# Patient Record
Sex: FEMALE | Race: WHITE
[De-identification: names, ages, dates, MRNs, and addresses within clinical notes are randomized per-mention and may not be internally consistent; named-entity substitution may affect disease eponyms.]

---

## 2018-03-23 ENCOUNTER — HOSPITAL ENCOUNTER (EMERGENCY)
Dept: HOSPITAL 75 - ER | Age: 83
Discharge: HOME | End: 2018-03-23
Payer: MEDICARE

## 2018-03-23 VITALS — HEIGHT: 63 IN | BODY MASS INDEX: 31.89 KG/M2 | WEIGHT: 180 LBS

## 2018-03-23 VITALS — DIASTOLIC BLOOD PRESSURE: 63 MMHG | SYSTOLIC BLOOD PRESSURE: 144 MMHG

## 2018-03-23 DIAGNOSIS — Z04.3: Primary | ICD-10-CM

## 2018-03-23 DIAGNOSIS — R40.2142: ICD-10-CM
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
## 2018-03-23 DIAGNOSIS — R40.2362: ICD-10-CM

## 2018-03-23 DIAGNOSIS — R40.2252: ICD-10-CM

## 2018-03-23 PROCEDURE — 72125 CT NECK SPINE W/O DYE: CPT

## 2018-03-23 PROCEDURE — 70450 CT HEAD/BRAIN W/O DYE: CPT

## 2018-03-23 NOTE — ED FALL/INJURY
General


Chief Complaint:  Trauma-Non Activation


Stated Complaint:  FALL


Nursing Triage Note:  


pt brought in by ems from [facility]. per nursing home staff, pt 


rolled out of bed and was found on floor. per pt, she had gotten herself 


dressed, fell on floor, and picked herself back up off the floor. pt tello any 


pain or hitting her head.


Source:  patient, EMS


Exam Limitations:  clinical condition (baseline dementia)





History of Present Illness


Date Seen by Provider:  Mar 23, 2018


Time Seen by Provider:  01:30


Initial Comments


Patient presents to ER by EMS from [facility] where staff says that 

she rolled out of bed she says she got herself back up because of back to bed 

and EMS says she was in bed and they got there. Patient denies having any pain 

anywhere. She was walking around the room without difficulty. She has no 

changes in vision, numbness, headache, nausea, shortness of breath, dysuria.





Allergies and Home Medications


Allergies


Coded Allergies:  


     No Known Drug Allergies (Unverified , 3/23/18)





Patient Home Medication List


Home Medication List Reviewed:  Yes





Constitutional:  No chills, No diaphoresis


Eyes:  Denies Blindness, Denies Blurred Vision, Denies Drainage


Ears, Nose, Mouth, Throat:  denies ear pain, denies nose pain


Respiratory:  No cough, No short of breath


Cardiovascular:  No chest pain, No edema


Gastrointestinal:  No abdominal pain, No diarrhea, No nausea


Genitourinary:  No discharge, No dysuria





Past Medical-Social-Family Hx


Patient Social History


Alcohol Use:  Denies Use


Recreational Drug Use:  No


Smoking Status:  Never a Smoker


Recent Foreign Travel:  No


Contact w/Someone Who Travel:  No


Recent Infectious Disease Expo:  No





Physical Exam


Vital Signs





Vital Signs - First Documented








 3/23/18





 00:42


 


Pulse 85


 


Resp 20


 


B/P (MAP) 144/63 (90)


 


Pulse Ox 95


 


O2 Delivery Room Air





Capillary Refill : Less Than 3 Seconds


General Appearance:  WD/WN, no apparent distress


HEENT:  PERRL/EOMI, pharynx normal, other (negative for hemotympanum or Puentes 

sign. No raccoon eyes)


Neck:  non-tender, full range of motion, supple, normal inspection


Cardiovascular:  normal peripheral pulses, regular rate, rhythm, no edema


Respiratory:  chest non-tender, lungs clear, normal breath sounds, no 

respiratory distress, no accessory muscle use


Peripheral Pulses:  2+ Dorsalis Pedis (R), 2+ Left Dors-Pedis (L), 2+ Radial 

Pulses (R), 2+ Radial Pulses (L)


Gastrointestinal:  normal bowel sounds, non tender, soft


Extremities:  normal range of motion, non-tender, normal capillary refill


Neurologic/Psychiatric:  CNs II-XII nml as tested, no motor/sensory deficits, 

alert, normal mood/affect, other (oriented to person and place)


Skin:  normal color, warm/dry





Dawson Coma Score


Best Eye Response:  (4) Open Spontaneously


Best Verbal Response:  (5) Oriented


Best Motor Response:  (6) Obeys Commands


Dawson Total:  15





Progress/Results/Core Measures


Results/Orders


My Orders





Orders - GREG SCHNEIDER


Ct Head/Cervical Spine Wo (3/23/18 03:23)





Vital Signs/I&O





Vital Sign - Last 12Hours








 3/23/18





 00:42


 


Pulse 85


 


Resp 20


 


B/P (MAP) 144/63 (90)


 


Pulse Ox 95


 


O2 Delivery Room Air














Blood Pressure Mean:  90








Progress Note :  


   Time:  03:27


Progress Note


The patient is not on aspirin or antiplatelet or blood thinners. She has been 

walking around the room at baseline. According to EMS her primary care provider 

was just trying to get her to go to [place] to get a CT scan of the head but 

they do not have CT capability.





Diagnostic Imaging





   Diagonstic Imaging:  CT


   Plain Films/CT/US/NM/MRI:  c-spine, head


Comments


No CT evidence for acute intracranial injury. No definite evidence for cervical 

spine fracture. Evaluation somewhat limited secondary to motion artifact. There 

is straightening of the normal cervical lordosis which may be related to muscle 

spasm. Degenerative changes of the cervical spine.


   Reviewed:  Reviewed by Me





Departure


Impression


Impression:  


 Primary Impression:  


 Fall


 Qualified Codes:  W19.XXXA - Unspecified fall, initial encounter


Disposition:  01 HOME, SELF-CARE


Condition:  Stable





Departure-Patient Inst.


Decision time for Depature:  04:33


Referrals:  


KWABENA ROWAN MD (PCP/Family)


Primary Care Physician


Patient Instructions:  Preventing Falls in the Older Adult





Copy


Copies To 1:   KWABENA ROWAN MD, TITUS J Mar 23, 2018 03:29

## 2018-03-23 NOTE — XMS REPORT
MU2 Ambulatory Summary

 Created on: 2017



Nay Goddard

External Reference #: [id number]

: 1929

Sex: Female



Demographics







 Address  [address] [place]  [address]

 

 Home Phone  [phone]

 

 Preferred Language  English

 

 Marital Status  Never 

 

 Scientologist Affiliation  Unknown

 

 Race  White

 

 Ethnic Group  Not  or 





Author







 Author  Afua Mcqueen

 

 [facility]

 

 Address  [address]

[place], [place]  [id number]



 

 Phone  [phone]







Care Team Providers







 Care Team Member Name  Role  Phone

 

 Afua Mcqueen  PCP  [phone]

 

 Afua Mcqueen  PreferredProvider  [id number]







Allergies and Adverse Reactions







 Name  Reaction  Notes

 

 No known allergies      







Plan of Treatment

Not available.



Medications







 Active 

 

 Name  Start Date  Estimated Completion Date  SIG  Comments

 

 carvedilol 25 mg oral tablet        take 1 tablet (25 mg) by oral route 2 
times per day with food   

 

 gemfibrozil 600 mg oral tablet        take 1 tablet (600 mg) by oral route 2 
times per day 30 minutes before morning and evening meal   

 

 levothyroxine 100 mcg oral tablet        take 1 tablet (100 mcg) by oral route 
once daily   

 

 trazodone 50 mg oral tablet        take 1 tablet (50 mg) by oral route once 
daily at bedtime   

 

 Vitamin D3 1,000 unit oral capsule        take 1 capsule by oral route daily   

 

 tramadol 50 mg oral tablet        take 1 tablet (50 mg) by oral route every 6 
hours as needed   

 

 fish oil with omega 3 600 mg / 300mg oral        take one capsule daily   

 

 loratadine 10 mg oral tablet        take 1 tablet (10 mg) by oral route once 
daily   

 

 cyanocobalamin (vitamin B-12) 1,000 mcg/mL injection solution  2017  3/21/
2018  inject 1 milliliter (1,000 mcg) by intramuscular route once a month for 
300 days   









  

 

 Name  Start Date  Expiration Date  SIG  Comments

 

 Symbicort 160-4.5 mcg/actuation inhalation HFA aerosol inhaler  2017  1 puff am and pm  and then rinse throat   

 

 Zithromax Z-Donnie 250 mg oral tablet  8/3/2017  2017  take 2 tablets (500 mg
) by oral route once daily for 1 day then 1 tablet (250 mg) by oral route once 
daily for 4 days   









 Discontinued 

 

 Name  Start Date  Discontinued Date  SIG  Comments

 

 tramadol 50 mg oral tablet     2017      







Problem List

Not available.



Vital Signs







 Date  Time  BP-Sys(mm[Hg]  BP-Dodie(mm[Hg])  HR(bpm)  RR(rpm)  Temp  WT  HT  HC  
BMI  BSA  BMI Percentile  O2 Sat(%)

 

 2017  3:13:00 PM  142 mmHg  90 mmHg  98 bpm  18 rpm  98.6 F  155 lbs  62 
in     28.35 kg/m2  1.75 m2     94 %

 

 2017  10:01:00 AM  112 mmHg  68 mmHg  54 bpm  18 rpm  97 F  154 lbs  62 
in     28.1667 kg/m  1.7481 m     93 %







Social History







 Name  Description  Comments

 

 retired      

 

 Tobacco  Former smoker  quit 25 years ago

 

 Alcohol  Never   

 

 Caffeine  Current every day   

 

 Exercises regularly     walking, gardening, cleaning house

 

       







History of Procedures







 Date Ordered  Description  Order Status

 

 2017 12:00 AM  URINALYSIS AUTO W/O SCOPE  Reviewed

 

 2017 12:00 AM  ASSAY OF CREATINE  Reviewed

 

 2017 12:00 AM  COMPLETE CBC AUTOMATED  Reviewed

 

 2017 12:00 AM  GLYCOSYLATED HEMOGLOBIN TEST  Reviewed

 

 2017 12:00 AM  COMPREHEN METABOLIC PANEL  Reviewed

 

 2017 12:00 AM  LIPID PANEL  Reviewed

 

 2017 12:00 AM  ASSAY OF MAGNESIUM  Reviewed

 

 2017 12:00 AM  ASSAY OF BLOOD/URIC ACID  Reviewed

 

 2017 12:00 AM  ASSAY TRIIODOTHYRONINE (T3)  Reviewed

 

 2017 12:00 AM  ASSAY THYROID STIM HORMONE  Reviewed

 

 2017 12:00 AM  ASSAY OF FREE THYROXINE  Reviewed

 

 2017 12:00 AM  VITAMIN D 25 HYDROXY  Reviewed

 

 2017 12:00 AM  VITAMIN B-12  Reviewed

 

 2017 12:00 AM  ASSAY OF FOLIC ACID SERUM  Reviewed

 

 2017 12:00 AM  ASSAY OF PARATHORMONE  Reviewed

 

 2017 12:00 AM  ROUTINE VENIPUNCTURE  Reviewed

 

 2017 12:00 AM  THER/PROPH/DIAG INJ SC/IM  Reviewed

 

 2017 12:00 AM  Depo-Medrol 40mg Injection  Reviewed

 

 8/3/2017 12:00 AM  THER/PROPH/DIAG INJ SC/IM  Reviewed

 

 8/3/2017 12:00 AM  Decadron 4mg Injection  Reviewed

 

 8/3/2017 12:00 AM  Depo-Medrol 40mg Injection  Reviewed

 

 2017 12:00 AM  THER/PROPH/DIAG INJ SC/IM  Reviewed

 

 2017 12:00 AM  B12 1000mcg Injection  Reviewed







Results Summary







 Date and Description  Results

 

 2017 9:00 AM  FREE T4 0.71 TSH 4.630 uIU/mLT3 TOTAL 51.0 ng/dLVITAMIN B12 
606.0 pg/mLFOLATE 6.30 ng/mLPROTEIN UR <7 mg/dLCREAT UR RAND 18.90 mg/dLVITAMIN 
D 55.20 ng/mLHGB A1C 5.20 %Est Avg Glucose 102.5 mg/dLGLUCOSE 104.0 mg/dLSODIUM 
139.0 mmol/LPOTASSIUM 5.30 mmol/LCHLORIDE 103.0 mmol/LCO2 30.0 mmol/LBUN 23.0 mg
/dLCREATININE 1.60 mg/dLSGOT/AST 16.0 IU/LSGPT/ALT 7.0 IU/LALK PHOS 65.0 IU/
LTOTAL PROTEIN 6.60 g/dLALBUMIN 4.0 g/dLTOTAL BILI 0.30 mg/dLCALCIUM 9.0 mg/
dLAGE 87 GFR NonAA 30 GFR AA 36 eGFR 30 eGFR AA* 36 MAGNESIUM 2.0 mg/
dLTRIGLYCERIDES 145.0 mg/dLCHOLESTEROL 226.0 mg/dLHDL 27.0 mg/dLTOT CHOL/HDL 
8.4 LDL (CALC) 170.0 mg/dLURIC ACID 6.2 mg/dLWBC 4.9 RBC 3.42 HGB 10.40 g/dLHCT 
33.30 %MCV 97.0 fLMCH 30.40 pgMCHC 31.20 g/dLRDW SD 50 RDW CV 14.0 %MPV 11.50 
fLPLT 152 NRBC# 0.00 NRBC% 0.0 %NEUT 64.70 %%LYMP 19.90 %%MONO 7.30 %%EOS 6.70 %
%BASO 0.80 %#NEUT 3.18 #LYMP 0.98 #MONO 0.36 #EOS 0.33 #BASO 0.04 SEGS 66 BANDS 
1 LYMPHS 20 MONOS 8 EOS 5.0 %PTH, Intact 38 







History Of Immunizations

Not available.



History of Past Illness







 Name  Date of Onset  Comments

 

 Arthritis      

 

 Hyperlipemia      

 

 Kidney disease      

 

 Hypothyroidism      

 

 Seasonal allergic rhinitis due to pollen  May 25 2017 10:12AM   

 

 Hyperlipidemia, Mixed  May 25 2017 10:12AM   

 

 Chronic kidney disease, Stage III (moderate)  May 25 2017 10:12AM   

 

 Acquired hypothyroidism  May 25 2017 10:12AM   

 

 Pernicious anemia  May 25 2017 10:12AM   

 

 Vitamin D deficiency  May 25 2017 10:12AM   

 

 COPD (chronic obstructive pulmonary disease) with chronic bronchitis  May 25 
2017 10:12AM   

 

 Insomnia  May 25 2017 10:12AM   

 

 Arthritis  May 25 2017 10:12AM   

 

 Hyperparathyroidism due to renal insufficiency  May 25 2017 10:12AM   

 

 Hyperglycemia  May 25 2017 10:12AM   

 

 Benign essential HTN  May 25 2017 10:12AM   

 

 Acute kidney failure, unspecified  2017  9:36AM   

 

 Chronic kidney disease, stage 3 (moderate)  2017  9:36AM   

 

 Essential hypertension  2017  9:36AM   

 

 Blood glucose elevated  2017  9:36AM   

 

 Acquired hyperlipoproteinemia  2017  9:36AM   

 

 Acquired hypothyroidism  2017  9:36AM   

 

 Low vitamin D level  2017  9:36AM   

 

 [place] anemia  2017  9:36AM   

 

 Hyperparathyroidism, secondary renal  2017  9:36AM   

 

 Other vitamin B12 deficiency anemia  2017  3:16PM   

 

 DDD (degenerative disc disease), lumbar  2017  3:16PM   

 

 Sciatica of left side  2017  3:16PM   

 

 CKD (chronic kidney disease) stage 3, GFR 30-59 ml/min  2017  3:16PM   

 

 Acute seasonal allergic rhinitis due to pollen  Aug  3 2017 11:57AM   

 

 Acute bronchitis, unspecified organism  Aug  3 2017 11:57AM   

 

 B12 deficiency  2017 10:25AM   







Payers







 Insurance Name  Company Name  Plan Name  Plan Number  Policy Number  Policy 
Group Number  Start Date

 

    Humana  Humana     [id number]     N/A

 

    Medicare Part B  [employer] [place]     [id number]     N/A







History of Encounters







 Visit Date  Visit Type  Provider

 

 2017  Nurse visit  Afua Mcqueen APRN

 

 8/3/2017  Office visit  Afua Mcqueen APRN

 

 2017  Office visit  Afua Mcqueen APRN

 

 2017  Laboratory  Afua Mcqueen APRN

 

 2017  Office visit  Afua Mcqueen APRN

 

 2011  Alvaro Zavala MD

## 2018-03-23 NOTE — XMS REPORT
MU2 Ambulatory Summary

 Created on: 2017



Nay Goddard

External Reference #: [id number]

: 1929

Sex: Female



Demographics







 Address  [address] [place]  [address]

 

 Home Phone  [phone]

 

 Preferred Language  English

 

 Marital Status  Never 

 

 Amish Affiliation  Unknown

 

 Race  White

 

 Ethnic Group  Not  or 





Author







 Author  Afua Mcqueen

 

 [facility]

 

 Address  [address]

[place], [place]  [address]



 

 Phone  [phone]







Care Team Providers







 Care Team Member Name  Role  Phone

 

 Afua Mcqueen  PCP  [phone]

 

 Afua Mcqueen  PreferredProvider  [id number]







Allergies and Adverse Reactions







 Name  Reaction  Notes

 

 No known allergies      







Plan of Treatment

Not available.



Medications







 Active 

 

 Name  Start Date  Estimated Completion Date  SIG  Comments

 

 carvedilol 25 mg oral tablet        take 1 tablet (25 mg) by oral route 2 
times per day with food   

 

 gemfibrozil 600 mg oral tablet        take 1 tablet (600 mg) by oral route 2 
times per day 30 minutes before morning and evening meal   

 

 levothyroxine 100 mcg oral tablet        take 1 tablet (100 mcg) by oral route 
once daily   

 

 trazodone 50 mg oral tablet        take 1 tablet (50 mg) by oral route once 
daily at bedtime   

 

 Vitamin D3 1,000 unit oral capsule        take 1 capsule by oral route daily   

 

 tramadol 50 mg oral tablet        take 1 tablet (50 mg) by oral route every 6 
hours as needed   

 

 fish oil with omega 3 600 mg / 300mg oral        take one capsule daily   

 

 loratadine 10 mg oral tablet        take 1 tablet (10 mg) by oral route once 
daily   

 

 cyanocobalamin (vitamin B-12) 1,000 mcg/mL injection solution  2017  3/21/
2018  inject 1 milliliter (1,000 mcg) by intramuscular route once a month for 
300 days   









  

 

 Name  Start Date  Expiration Date  SIG  Comments

 

 Symbicort 160-4.5 mcg/actuation inhalation HFA aerosol inhaler  2017  1 puff am and pm  and then rinse throat   









 Discontinued 

 

 Name  Start Date  Discontinued Date  SIG  Comments

 

 tramadol 50 mg oral tablet     2017      







Problem List

Not available.



Vital Signs







 Date  Time  BP-Sys(mm[Hg]  BP-Dodie(mm[Hg])  HR(bpm)  RR(rpm)  Temp  WT  HT  HC  
BMI  BSA  BMI Percentile  O2 Sat(%)

 

 2017  3:13:00 PM  142 mmHg  90 mmHg  98 bpm  18 rpm  98.6 F  155 lbs  62 
in     28.35 kg/m2  1.75 m2     94 %

 

 2017  10:01:00 AM  112 mmHg  68 mmHg  54 bpm  18 rpm  97 F  154 lbs  62 
in     28.1667 kg/m  1.7481 m     93 %







Social History







 Name  Description  Comments

 

 retired      

 

 Tobacco  Former smoker  quit 25 years ago

 

 Alcohol  Never   

 

 Caffeine  Current every day   

 

 Exercises regularly     walking, gardening, cleaning house

 

       







History of Procedures







 Date Ordered  Description  Order Status

 

 2017 12:00 AM  URINALYSIS AUTO W/O SCOPE  Reviewed

 

 2017 12:00 AM  ASSAY OF CREATINE  Returned

 

 2017 12:00 AM  COMPLETE CBC AUTOMATED  Returned

 

 2017 12:00 AM  GLYCOSYLATED HEMOGLOBIN TEST  Returned

 

 2017 12:00 AM  COMPREHEN METABOLIC PANEL  Returned

 

 2017 12:00 AM  LIPID PANEL  Returned

 

 2017 12:00 AM  ASSAY OF MAGNESIUM  Returned

 

 2017 12:00 AM  ASSAY OF BLOOD/URIC ACID  Returned

 

 2017 12:00 AM  ASSAY TRIIODOTHYRONINE (T3)  Returned

 

 2017 12:00 AM  ASSAY THYROID STIM HORMONE  Returned

 

 2017 12:00 AM  ASSAY OF FREE THYROXINE  Returned

 

 2017 12:00 AM  VITAMIN D 25 HYDROXY  Returned

 

 2017 12:00 AM  VITAMIN B-12  Returned

 

 2017 12:00 AM  ASSAY OF FOLIC ACID SERUM  Returned

 

 2017 12:00 AM  ASSAY OF PARATHORMONE  Returned

 

 2017 12:00 AM  ROUTINE VENIPUNCTURE  Reviewed

 

 2017 12:00 AM  THER/PROPH/DIAG INJ SC/IM  Reviewed

 

 2017 12:00 AM  Depo-Medrol 40mg Injection  Reviewed







Results Summary







 Date and Description  Results

 

 2017 9:00 AM  FREE T4 0.71 TSH 4.630 uIU/mLT3 TOTAL 51.0 ng/dLVITAMIN B12 
606.0 pg/mLFOLATE 6.30 ng/mLPROTEIN UR <7 mg/dLCREAT UR RAND 18.90 mg/dLVITAMIN 
D 55.20 ng/mLHGB A1C 5.20 %Est Avg Glucose 102.5 mg/dLGLUCOSE 104.0 mg/dLSODIUM 
139.0 mmol/LPOTASSIUM 5.30 mmol/LCHLORIDE 103.0 mmol/LCO2 30.0 mmol/LBUN 23.0 mg
/dLCREATININE 1.60 mg/dLSGOT/AST 16.0 IU/LSGPT/ALT 7.0 IU/LALK PHOS 65.0 IU/
LTOTAL PROTEIN 6.60 g/dLALBUMIN 4.0 g/dLTOTAL BILI 0.30 mg/dLCALCIUM 9.0 mg/
dLAGE 87 GFR NonAA 30 GFR AA 36 eGFR 30 eGFR AA* 36 MAGNESIUM 2.0 mg/
dLTRIGLYCERIDES 145.0 mg/dLCHOLESTEROL 226.0 mg/dLHDL 27.0 mg/dLTOT CHOL/HDL 
8.4 LDL (CALC) 170.0 mg/dLURIC ACID 6.2 mg/dLWBC 4.9 RBC 3.42 HGB 10.40 g/dLHCT 
33.30 %MCV 97.0 fLMCH 30.40 pgMCHC 31.20 g/dLRDW SD 50 RDW CV 14.0 %MPV 11.50 
fLPLT 152 NRBC# 0.00 NRBC% 0.0 %NEUT 64.70 %%LYMP 19.90 %%MONO 7.30 %%EOS 6.70 %
%BASO 0.80 %#NEUT 3.18 #LYMP 0.98 #MONO 0.36 #EOS 0.33 #BASO 0.04 SEGS 66 BANDS 
1 LYMPHS 20 MONOS 8 EOS 5.0 %PTH, Intact 38 







History Of Immunizations

Not available.



History of Past Illness







 Name  Date of Onset  Comments

 

 Arthritis      

 

 Hyperlipemia      

 

 Kidney disease      

 

 Hypothyroidism      

 

 Seasonal allergic rhinitis due to pollen  May 25 2017 10:12AM   

 

 Hyperlipidemia, Mixed  May 25 2017 10:12AM   

 

 Chronic kidney disease, Stage III (moderate)  May 25 2017 10:12AM   

 

 Acquired hypothyroidism  May 25 2017 10:12AM   

 

 Pernicious anemia  May 25 2017 10:12AM   

 

 Vitamin D deficiency  May 25 2017 10:12AM   

 

 COPD (chronic obstructive pulmonary disease) with chronic bronchitis  May 25 
2017 10:12AM   

 

 Insomnia  May 25 2017 10:12AM   

 

 Arthritis  May 25 2017 10:12AM   

 

 Hyperparathyroidism due to renal insufficiency  May 25 2017 10:12AM   

 

 Hyperglycemia  May 25 2017 10:12AM   

 

 Benign essential HTN  May 25 2017 10:12AM   

 

 Acute kidney failure, unspecified  2017  9:36AM   

 

 Chronic kidney disease, stage 3 (moderate)  2017  9:36AM   

 

 Essential hypertension  2017  9:36AM   

 

 Blood glucose elevated  2017  9:36AM   

 

 Acquired hyperlipoproteinemia  2017  9:36AM   

 

 Acquired hypothyroidism  2017  9:36AM   

 

 Low vitamin D level  2017  9:36AM   

 

 [place] anemia  2017  9:36AM   

 

 Hyperparathyroidism, secondary renal  2017  9:36AM   

 

 Other vitamin B12 deficiency anemia  2017  3:16PM   

 

 DDD (degenerative disc disease), lumbar  2017  3:16PM   

 

 Sciatica of left side  2017  3:16PM   

 

 CKD (chronic kidney disease) stage 3, GFR 30-59 ml/min  2017  3:16PM   







Payers







 Insurance Name  Company Name  Plan Name  Plan Number  Policy Number  Policy 
Group Number  Start Date

 

    Humana  Hum[employer]     [id number]     N/A

 

    Medicare Part B  [place] [place]     [id number]     N/A







History of Encounters







 Visit Date  Visit Type  Provider

 

 2017  Office visit  Afua Mcqueen APRN

 

 2017  Laboratory  Afua Mcqueen APRN

 

 2017  Office visit  Afua Mcqueen APRN

 

 2011  Hospital  JESSICA Zavala MD

## 2018-03-23 NOTE — XMS REPORT
MU2 Ambulatory Summary

 Created on: 2017



Nay Goddard

External Reference #: [id number]

: 1929

Sex: Female



Demographics







 Address  [address] [place]  [address]

 

 Home Phone  [phone]

 

 Preferred Language  English

 

 Marital Status  Never 

 

 Congregational Affiliation  Unknown

 

 Race  White

 

 Ethnic Group  Not  or 





Author







 Author  Afua Mcqueen

 

 [facility]

 

 Address  [address]

[place], [place]  [address]



 

 Phone  [phone]







Care Team Providers







 Care Team Member Name  Role  Phone

 

 Afua Mcqueen  PCP  [phone]

 

 Afua Mcqueen  PreferredProvider  [id number]







Allergies and Adverse Reactions







 Name  Reaction  Notes

 

 No known allergies      







Plan of Treatment

Not available.



Medications







 Active 

 

 Name  Start Date  Estimated Completion Date  SIG  Comments

 

 carvedilol 25 mg oral tablet        take 1 tablet (25 mg) by oral route 2 
times per day with food   

 

 gemfibrozil 600 mg oral tablet        take 1 tablet (600 mg) by oral route 2 
times per day 30 minutes before morning and evening meal   

 

 levothyroxine 100 mcg oral tablet        take 1 tablet (100 mcg) by oral route 
once daily   

 

 trazodone 50 mg oral tablet        take 1 tablet (50 mg) by oral route once 
daily at bedtime   

 

 Vitamin D3 1,000 unit oral capsule        take 1 capsule by oral route daily   

 

 tramadol 50 mg oral tablet        take 1 tablet (50 mg) by oral route every 6 
hours as needed   

 

 fish oil with omega 3 600 mg / 300mg oral        take one capsule daily   

 

 loratadine 10 mg oral tablet        take 1 tablet (10 mg) by oral route once 
daily   

 

 cyanocobalamin (vitamin B-12) 1,000 mcg/mL injection solution  2017  3/21/
2018  inject 1 milliliter (1,000 mcg) by intramuscular route once a month for 
300 days   









  

 

 Name  Start Date  Expiration Date  SIG  Comments

 

 Symbicort 160-4.5 mcg/actuation inhalation HFA aerosol inhaler  2017  1 puff am and pm  and then rinse throat   

 

 Zithromax Z-Donnie 250 mg oral tablet  8/3/2017  2017  take 2 tablets (500 mg
) by oral route once daily for 1 day then 1 tablet (250 mg) by oral route once 
daily for 4 days   









 Discontinued 

 

 Name  Start Date  Discontinued Date  SIG  Comments

 

 tramadol 50 mg oral tablet     2017      







Problem List

Not available.



Vital Signs







 Date  Time  BP-Sys(mm[Hg]  BP-Dodie(mm[Hg])  HR(bpm)  RR(rpm)  Temp  WT  HT  HC  
BMI  BSA  BMI Percentile  O2 Sat(%)

 

 2017  3:13:00 PM  142 mmHg  90 mmHg  98 bpm  18 rpm  98.6 F  155 lbs  62 
in     28.35 kg/m2  1.75 m2     94 %

 

 2017  10:01:00 AM  112 mmHg  68 mmHg  54 bpm  18 rpm  97 F  154 lbs  62 
in     28.1667 kg/m  1.7481 m     93 %







Social History







 Name  Description  Comments

 

 retired      

 

 Tobacco  Former smoker  quit 25 years ago

 

 Alcohol  Never   

 

 Caffeine  Current every day   

 

 Exercises regularly     walking, gardening, cleaning house

 

       







History of Procedures







 Date Ordered  Description  Order Status

 

 2017 12:00 AM  URINALYSIS AUTO W/O SCOPE  Reviewed

 

 2017 12:00 AM  ASSAY OF CREATINE  Reviewed

 

 2017 12:00 AM  COMPLETE CBC AUTOMATED  Reviewed

 

 2017 12:00 AM  GLYCOSYLATED HEMOGLOBIN TEST  Reviewed

 

 2017 12:00 AM  COMPREHEN METABOLIC PANEL  Reviewed

 

 2017 12:00 AM  LIPID PANEL  Reviewed

 

 2017 12:00 AM  ASSAY OF MAGNESIUM  Reviewed

 

 2017 12:00 AM  ASSAY OF BLOOD/URIC ACID  Reviewed

 

 2017 12:00 AM  ASSAY TRIIODOTHYRONINE (T3)  Reviewed

 

 2017 12:00 AM  ASSAY THYROID STIM HORMONE  Reviewed

 

 2017 12:00 AM  ASSAY OF FREE THYROXINE  Reviewed

 

 2017 12:00 AM  VITAMIN D 25 HYDROXY  Reviewed

 

 2017 12:00 AM  VITAMIN B-12  Reviewed

 

 2017 12:00 AM  ASSAY OF FOLIC ACID SERUM  Reviewed

 

 2017 12:00 AM  ASSAY OF PARATHORMONE  Reviewed

 

 2017 12:00 AM  ROUTINE VENIPUNCTURE  Reviewed

 

 2017 12:00 AM  THER/PROPH/DIAG INJ SC/IM  Reviewed

 

 2017 12:00 AM  Depo-Medrol 40mg Injection  Reviewed

 

 8/3/2017 12:00 AM  THER/PROPH/DIAG INJ SC/IM  Reviewed

 

 8/3/2017 12:00 AM  Decadron 4mg Injection  Reviewed

 

 8/3/2017 12:00 AM  Depo-Medrol 40mg Injection  Reviewed

 

 2017 12:00 AM  THER/PROPH/DIAG INJ SC/IM  Reviewed

 

 2017 12:00 AM  B12 1000mcg Injection  Reviewed







Results Summary







 Date and Description  Results

 

 2017 9:00 AM  FREE T4 0.71 TSH 4.630 uIU/mLT3 TOTAL 51.0 ng/dLVITAMIN B12 
606.0 pg/mLFOLATE 6.30 ng/mLPROTEIN UR <7 mg/dLCREAT UR RAND 18.90 mg/dLVITAMIN 
D 55.20 ng/mLHGB A1C 5.20 %Est Avg Glucose 102.5 mg/dLGLUCOSE 104.0 mg/dLSODIUM 
139.0 mmol/LPOTASSIUM 5.30 mmol/LCHLORIDE 103.0 mmol/LCO2 30.0 mmol/LBUN 23.0 mg
/dLCREATININE 1.60 mg/dLSGOT/AST 16.0 IU/LSGPT/ALT 7.0 IU/LALK PHOS 65.0 IU/
LTOTAL PROTEIN 6.60 g/dLALBUMIN 4.0 g/dLTOTAL BILI 0.30 mg/dLCALCIUM 9.0 mg/
dLAGE 87 GFR NonAA 30 GFR AA 36 eGFR 30 eGFR AA* 36 MAGNESIUM 2.0 mg/
dLTRIGLYCERIDES 145.0 mg/dLCHOLESTEROL 226.0 mg/dLHDL 27.0 mg/dLTOT CHOL/HDL 
8.4 LDL (CALC) 170.0 mg/dLURIC ACID 6.2 mg/dLWBC 4.9 RBC 3.42 HGB 10.40 g/dLHCT 
33.30 %MCV 97.0 fLMCH 30.40 pgMCHC 31.20 g/dLRDW SD 50 RDW CV 14.0 %MPV 11.50 
fLPLT 152 NRBC# 0.00 NRBC% 0.0 %NEUT 64.70 %%LYMP 19.90 %%MONO 7.30 %%EOS 6.70 %
%BASO 0.80 %#NEUT 3.18 #LYMP 0.98 #MONO 0.36 #EOS 0.33 #BASO 0.04 SEGS 66 BANDS 
1 LYMPHS 20 MONOS 8 EOS 5.0 %PTH, Intact 38 







History Of Immunizations

Not available.



History of Past Illness







 Name  Date of Onset  Comments

 

 Arthritis      

 

 Hyperlipemia      

 

 Kidney disease      

 

 Hypothyroidism      

 

 Seasonal allergic rhinitis due to pollen  May 25 2017 10:12AM   

 

 Hyperlipidemia, Mixed  May 25 2017 10:12AM   

 

 Chronic kidney disease, Stage III (moderate)  May 25 2017 10:12AM   

 

 Acquired hypothyroidism  May 25 2017 10:12AM   

 

 Pernicious anemia  May 25 2017 10:12AM   

 

 Vitamin D deficiency  May 25 2017 10:12AM   

 

 COPD (chronic obstructive pulmonary disease) with chronic bronchitis  May 25 
2017 10:12AM   

 

 Insomnia  May 25 2017 10:12AM   

 

 Arthritis  May 25 2017 10:12AM   

 

 Hyperparathyroidism due to renal insufficiency  May 25 2017 10:12AM   

 

 Hyperglycemia  May 25 2017 10:12AM   

 

 Benign essential HTN  May 25 2017 10:12AM   

 

 Acute kidney failure, unspecified  2017  9:36AM   

 

 Chronic kidney disease, stage 3 (moderate)  2017  9:36AM   

 

 Essential hypertension  2017  9:36AM   

 

 Blood glucose elevated  2017  9:36AM   

 

 Acquired hyperlipoproteinemia  2017  9:36AM   

 

 Acquired hypothyroidism  2017  9:36AM   

 

 Low vitamin D level  2017  9:36AM   

 

 [place] anemia  2017  9:36AM   

 

 Hyperparathyroidism, secondary renal  2017  9:36AM   

 

 Other vitamin B12 deficiency anemia  2017  3:16PM   

 

 DDD (degenerative disc disease), lumbar  2017  3:16PM   

 

 Sciatica of left side  2017  3:16PM   

 

 CKD (chronic kidney disease) stage 3, GFR 30-59 ml/min  2017  3:16PM   

 

 Acute seasonal allergic rhinitis due to pollen  Aug  3 2017 11:57AM   

 

 Acute bronchitis, unspecified organism  Aug  3 2017 11:57AM   

 

 B12 deficiency  2017 10:25AM   







Payers







 Insurance Name  Company Name  Plan Name  Plan Number  Policy Number  Policy 
Group Number  Start Date

 

    Humana  Humana     [id number]     N/A

 

    Medicare [place] B  [place]     [id number]     N/A







History of Encounters







 Visit Date  Visit Type  Provider

 

 2017  Nurse visit  Afua Mcqueen APRN

 

 8/3/2017  Office visit  Afua Mcqueen APRN

 

 2017  Office visit  Afua Mcqueen APRN

 

 2017  Laboratory  Afua Mcqueen APRN

 

 2017  Office visit  Afua Mcqueen APRN

 

 2011  Alvaro Zavala MD

## 2018-03-23 NOTE — XMS REPORT
MU2 Ambulatory Summary

 Created on: 2017



Nay Goddard

External Reference #: [id number]

: 1929

Sex: Female



Demographics







 Address  [address] [place]  [address]

 

 Home Phone  [phone]

 

 Preferred Language  English

 

 Marital Status  Never 

 

 Anglican Affiliation  Unknown

 

 Race  White

 

 Ethnic Group  Not  or 





Author







 Author  Afua Mcqueen

 

 [facility]

 

 Address  [address]

[place], [place]  [address]



 

 Phone  [phone]







Care Team Providers







 Care Team Member Name  Role  Phone

 

 Afua Mcqueen  PCP  [phone]

 

 Afua Mcqueen  PreferredProvider  [id number]







Allergies and Adverse Reactions







 Name  Reaction  Notes

 

 No known allergies      







Plan of Treatment







 Planned Activity  Comments  Planned Date  Planned Time  Plan/Goal

 

 PROTEIN URINE DIPSTICK     2017  12:00 AM   

 

 Creatine ur     2017  12:00 AM   

 

 CBC W/ MANUAL DIFF     2017  12:00 AM   

 

 HEMOGLOBIN A1C     2017  12:00 AM   

 

 CMP     2017  12:00 AM   

 

 LIPID PANEL     2017  12:00 AM   

 

 Magnesium level     2017  12:00 AM   

 

 URIC ACID.     2017  12:00 AM   

 

 Total triiodothyronine (T3) measurement     2017  12:00 AM   

 

 TSH     2017  12:00 AM   

 

 Free thyroxine (FT4) measurement     2017  12:00 AM   

 

 VITAMIN D (25 HYDROXY)     2017  12:00 AM   

 

 VITAMIN B12 & FOLATE     2017  12:00 AM   

 

 VITAMIN B12 & FOLATE     2017  12:00 AM   

 

 Parathyroid hormone (PTH) measurement     2017  12:00 AM   







Medications







 Active 

 

 Name  Start Date  Estimated Completion Date  SIG  Comments

 

 carvedilol 25 mg oral tablet        take 1 tablet (25 mg) by oral route 2 
times per day with food   

 

 gemfibrozil 600 mg oral tablet        take 1 tablet (600 mg) by oral route 2 
times per day 30 minutes before morning and evening meal   

 

 levothyroxine 100 mcg oral tablet        take 1 tablet (100 mcg) by oral route 
once daily   

 

 trazodone 50 mg oral tablet        take 1 tablet (50 mg) by oral route once 
daily at bedtime   

 

 Vitamin D3 1,000 unit oral capsule        take 1 capsule by oral route daily   

 

 tramadol 50 mg oral tablet        take 1 tablet (50 mg) by oral route every 6 
hours as needed   

 

 fish oil with omega 3 600 mg / 300mg oral        take one capsule daily   

 

 loratadine 10 mg oral tablet        take 1 tablet (10 mg) by oral route once 
daily   

 

 Symbicort 160-4.5 mcg/actuation inhalation HFA aerosol inhaler  2017  1 puff am and pm  and then rinse throat   

 

 cyanocobalamin (vitamin B-12) 1,000 mcg/mL injection solution  2017  3/21/
2018  inject 1 milliliter (1,000 mcg) by intramuscular route once a month for 
300 days   









 Discontinued 

 

 Name  Start Date  Discontinued Date  SIG  Comments

 

 tramadol 50 mg oral tablet     2017      







Problem List

Not available.



Vital Signs







 Date  Time  BP-Sys(mm[Hg]  BP-Dodie(mm[Hg])  HR(bpm)  RR(rpm)  Temp  WT  HT  HC  
BMI  BSA  BMI Percentile  O2 Sat(%)

 

 2017  10:01:00 AM  112 mmHg  68 mmHg  54 bpm  18 rpm  97 F  154 lbs  62 
in     28.17 kg/m2  1.75 m2     93 %







Social History







 Name  Description  Comments

 

 retired      

 

 Tobacco  Former smoker  quit 25 years ago

 

 Alcohol  Never   

 

 Caffeine  Current every day   

 

 Exercises regularly     walking, gardening, cleaning house

 

       







History of Procedures







 Date Ordered  Description  Order Status

 

 2017 12:00 AM  ROUTINE VENIPUNCTURE  Reviewed







Results Summary

Not available.



History Of Immunizations

Not available.



History of Past Illness







 Name  Date of Onset  Comments

 

 Arthritis      

 

 Hyperlipemia      

 

 Kidney disease      

 

 Hypothyroidism      

 

 Seasonal allergic rhinitis due to pollen  May 25 2017 10:12AM   

 

 Hyperlipidemia, Mixed  May 25 2017 10:12AM   

 

 Chronic kidney disease, Stage III (moderate)  May 25 2017 10:12AM   

 

 Acquired hypothyroidism  May 25 2017 10:12AM   

 

 Pernicious anemia  May 25 2017 10:12AM   

 

 Vitamin D deficiency  May 25 2017 10:12AM   

 

 COPD (chronic obstructive pulmonary disease) with chronic bronchitis  May 25 
2017 10:12AM   

 

 Insomnia  May 25 2017 10:12AM   

 

 Arthritis  May 25 2017 10:12AM   

 

 Hyperparathyroidism due to renal insufficiency  May 25 2017 10:12AM   

 

 Hyperglycemia  May 25 2017 10:12AM   

 

 Benign essential HTN  May 25 2017 10:12AM   

 

 Acute kidney failure, unspecified  2017  9:36AM   

 

 Chronic kidney disease, stage 3 (moderate)  2017  9:36AM   

 

 Essential hypertension  2017  9:36AM   

 

 Blood glucose elevated  2017  9:36AM   

 

 Acquired hyperlipoproteinemia  2017  9:36AM   

 

 Acquired hypothyroidism  2017  9:36AM   

 

 Low vitamin D level  2017  9:36AM   

 

 [place] anemia  2017  9:36AM   

 

 Hyperparathyroidism, secondary renal  2017  9:36AM   







Payers







 Insurance Name  Company Name  Plan Name  Plan Number  Policy Number  Policy 
Group Number  Start Date

 

    Humana  Humana     [id number]     N/A

 

    Medicare Part B  [place]     [id number]     N/A







History of Encounters







 Visit Date  Visit Type  Provider

 

 2017  Laboratory  Afua Mcqueen APRN

 

 2017  Office visit  Afua Mcqueen APRN

 

 2011  Hospital  JESSICA Zavala MD

## 2018-03-23 NOTE — XMS REPORT
MU2 Ambulatory Summary

 Created on: 2018



Nay Goddard

External Reference #: [id number]

: 1929

Sex: Female



Demographics







 Address  [address] [place]  [address]

 

 Home Phone  [phone]

 

 Preferred Language  English

 

 Marital Status  Never 

 

 Amish Affiliation  Unknown

 

 Race  White

 

 Ethnic Group  Not  or 





Author







 Author  Afua Mcqueen

 

 [facility]

 

 Address  [address]

[place], [place]  [address]



 

 Phone  [phone]







Care Team Providers







 Care Team Member Name  Role  Phone

 

 Afua Mcqueen  PCP  [phone]

 

 Afua Mcqueen  PreferredProvider  [phone]







Allergies and Adverse Reactions







 Name  Reaction  Notes

 

 No known allergies      







Plan of Treatment

Not available.



Medications







 Active 

 

 Name  Start Date  Estimated Completion Date  SIG  Comments

 

 levothyroxine 100 mcg oral tablet        take 1 tablet (100 mcg) by oral route 
once daily   

 

 Vitamin D3 1,000 unit oral capsule        take 1 capsule by oral route daily   

 

 fish oil with omega 3 600 mg / 300mg oral        take one capsule daily   

 

 cyanocobalamin (vitamin B-12) 1,000 mcg/mL injection solution  2017  3/21/
2018  inject 1 milliliter (1,000 mcg) by intramuscular route once a month for 
300 days   

 

 Namenda Titration Donnie 5-10 mg oral tablets,dose pack  2018     take as 
directed DAILY AS ON PACK   

 

 Celexa 20 mg oral tablet  2018  3/8/2018  take 1 tablet (20 mg) by oral 
route once daily in the morning for 30 days   

 

 carvedilol 25 mg oral tablet  2018  take 1 tablet (25 mg) by 
oral route  per day with food for 90 days   

 

 tramadol 50 mg oral tablet  2018  3/8/2018  take 1 tablet (50 mg) by oral 
route AT BEDTIME   









  

 

 Name  Start Date  Expiration Date  SIG  Comments

 

 Symbicort 160-4.5 mcg/actuation inhalation HFA aerosol inhaler  2017  1 puff am and pm  and then rinse throat   

 

 Zithromax Z-Donnie 250 mg oral tablet  8/3/2017  2017  take 2 tablets (500 mg
) by oral route once daily for 1 day then 1 tablet (250 mg) by oral route once 
daily for 4 days   









 Discontinued 

 

 Name  Start Date  Discontinued Date  SIG  Comments

 

 gemfibrozil 600 mg oral tablet     2018  take 1 tablet (600 mg) by oral 
route 2 times per day 30 minutes before morning and evening meal   

 

 tramadol 50 mg oral tablet     2017      

 

 trazodone 50 mg oral tablet     2018  take 1 tablet (50 mg) by oral route 
once daily at bedtime   

 

 loratadine 10 mg oral tablet     2018  take 1 tablet (10 mg) by oral route 
once daily   







Problem List

Not available.



Vital Signs







 Date  Time  BP-Sys(mm[Hg]  BP-Dodie(mm[Hg])  HR(bpm)  RR(rpm)  Temp  WT  HT  HC  
BMI  BSA  BMI Percentile  O2 Sat(%)

 

 2018  10:34:00 AM  132 mmHg  84 mmHg  58 bpm  18 rpm  97.6 F  145.375 lbs  
62 in     26.59 kg/m2  1.70 m2     97 %

 

 2017  3:13:00 PM  142 mmHg  90 mmHg  98 bpm  18 rpm  98.6 F  155 lbs  62 
in     28.3496 kg/m  1.7537 m     94 %

 

 2017  10:01:00 AM  112 mmHg  68 mmHg  54 bpm  18 rpm  97 F  154 lbs  62 
in     28.17 kg/m2  1.75 m2     93 %







Social History







 Name  Description  Comments

 

 retired      

 

 Tobacco  Former smoker  quit 25 years ago

 

 Alcohol  Never   

 

 Caffeine  Current every day   

 

 Exercises regularly     walking, gardening, cleaning house

 

       







History of Procedures







 Date Ordered  Description  Order Status

 

 2017 12:00 AM  URINALYSIS AUTO W/O SCOPE  Reviewed

 

 2017 12:00 AM  ASSAY OF CREATINE  Reviewed

 

 2017 12:00 AM  COMPLETE CBC AUTOMATED  Reviewed

 

 2017 12:00 AM  GLYCOSYLATED HEMOGLOBIN TEST  Reviewed

 

 2017 12:00 AM  COMPREHEN METABOLIC PANEL  Reviewed

 

 2017 12:00 AM  LIPID PANEL  Reviewed

 

 2017 12:00 AM  ASSAY OF MAGNESIUM  Reviewed

 

 2017 12:00 AM  ASSAY OF BLOOD/URIC ACID  Reviewed

 

 2017 12:00 AM  ASSAY TRIIODOTHYRONINE (T3)  Reviewed

 

 2017 12:00 AM  ASSAY THYROID STIM HORMONE  Reviewed

 

 2017 12:00 AM  ASSAY OF FREE THYROXINE  Reviewed

 

 2017 12:00 AM  VITAMIN D 25 HYDROXY  Reviewed

 

 2017 12:00 AM  VITAMIN B-12  Reviewed

 

 2017 12:00 AM  ASSAY OF FOLIC ACID SERUM  Reviewed

 

 2017 12:00 AM  ASSAY OF PARATHORMONE  Reviewed

 

 2017 12:00 AM  ROUTINE VENIPUNCTURE  Reviewed

 

 2017 12:00 AM  THER/PROPH/DIAG INJ SC/IM  Reviewed

 

 2017 12:00 AM  Depo-Medrol 40mg Injection  Reviewed

 

 8/3/2017 12:00 AM  THER/PROPH/DIAG INJ SC/IM  Reviewed

 

 8/3/2017 12:00 AM  Decadron 4mg Injection  Reviewed

 

 8/3/2017 12:00 AM  Depo-Medrol 40mg Injection  Reviewed

 

 2017 12:00 AM  THER/PROPH/DIAG INJ SC/IM  Reviewed

 

 2017 12:00 AM  B12 1000mcg Injection  Reviewed

 

 2018 12:00 AM  THERAPEUTIC PROPHYLACTIC/DX INJECTION SUBQ/IM  Reviewed

 

 2018 12:00 AM  B12 1000mcg Injection, RHC Medicare  Reviewed







Results Summary







 Date and Description  Results

 

 2017 9:00 AM  FREE T4 0.71 TSH 4.630 uIU/mLT3 TOTAL 51.0 ng/dLVITAMIN B12 
606.0 pg/mLFOLATE 6.30 ng/mLPROTEIN UR <7 mg/dLCREAT UR RAND 18.90 mg/dLVITAMIN 
D 55.20 ng/mLHGB A1C 5.20 %Est Avg Glucose 102.5 mg/dLGLUCOSE 104.0 mg/dLSODIUM 
139.0 mmol/LPOTASSIUM 5.30 mmol/LCHLORIDE 103.0 mmol/LCO2 30.0 mmol/LBUN 23.0 mg
/dLCREATININE 1.60 mg/dLSGOT/AST 16.0 IU/LSGPT/ALT 7.0 IU/LALK PHOS 65.0 IU/
LTOTAL PROTEIN 6.60 g/dLALBUMIN 4.0 g/dLTOTAL BILI 0.30 mg/dLCALCIUM 9.0 mg/
dLAGE 87 GFR NonAA 30 GFR AA 36 eGFR 30 eGFR AA* 36 MAGNESIUM 2.0 mg/
dLTRIGLYCERIDES 145.0 mg/dLCHOLESTEROL 226.0 mg/dLHDL 27.0 mg/dLTOT CHOL/HDL 
8.4 LDL (CALC) 170.0 mg/dLURIC ACID 6.2 mg/dLWBC 4.9 RBC 3.42 HGB 10.40 g/dLHCT 
33.30 %MCV 97.0 fLMCH 30.40 pgMCHC 31.20 g/dLRDW SD 50 RDW CV 14.0 %MPV 11.50 
fLPLT 152 NRBC# 0.00 NRBC% 0.0 %NEUT 64.70 %%LYMP 19.90 %%MONO 7.30 %%EOS 6.70 %
%BASO 0.80 %#NEUT 3.18 #LYMP 0.98 #MONO 0.36 #EOS 0.33 #BASO 0.04 SEGS 66 BANDS 
1 LYMPHS 20 MONOS 8 EOS 5.0 %PTH, Intact 38 







History Of Immunizations

Not available.



History of Past Illness







 Name  Date of Onset  Comments

 

 Arthritis      

 

 Hyperlipemia      

 

 Kidney disease      

 

 Hypothyroidism      

 

 Seasonal allergic rhinitis due to pollen  May 25 2017 10:12AM   

 

 Hyperlipidemia, Mixed  May 25 2017 10:12AM   

 

 Chronic kidney disease, Stage III (moderate)  May 25 2017 10:12AM   

 

 Acquired hypothyroidism  May 25 2017 10:12AM   

 

 Pernicious anemia  May 25 2017 10:12AM   

 

 Vitamin D deficiency  May 25 2017 10:12AM   

 

 COPD (chronic obstructive pulmonary disease) with chronic bronchitis  May 25 
2017 10:12AM   

 

 Insomnia  May 25 2017 10:12AM   

 

 Arthritis  May 25 2017 10:12AM   

 

 Hyperparathyroidism due to renal insufficiency  May 25 2017 10:12AM   

 

 Hyperglycemia  May 25 2017 10:12AM   

 

 Benign essential HTN  May 25 2017 10:12AM   

 

 Acute kidney failure, unspecified  2017  9:36AM   

 

 Chronic kidney disease, stage 3 (moderate)  2017  9:36AM   

 

 Essential hypertension  2017  9:36AM   

 

 Blood glucose elevated  2017  9:36AM   

 

 Acquired hyperlipoproteinemia  2017  9:36AM   

 

 Acquired hypothyroidism  2017  9:36AM   

 

 Low vitamin D level  2017  9:36AM   

 

 [place] anemia  2017  9:36AM   

 

 Hyperparathyroidism, secondary renal  2017  9:36AM   

 

 Other vitamin B12 deficiency anemia  2017  3:16PM   

 

 DDD (degenerative disc disease), lumbar  2017  3:16PM   

 

 Sciatica of left side  2017  3:16PM   

 

 CKD (chronic kidney disease) stage 3, GFR 30-59 ml/min  2017  3:16PM   

 

 Acute seasonal allergic rhinitis due to pollen  Aug  3 2017 11:57AM   

 

 Acute bronchitis, unspecified organism  Aug  3 2017 11:57AM   

 

 B12 deficiency  2017 10:25AM   

 

 B12 deficiency  2018  1:05PM   

 

 Alzheimer disease  2018 10:35AM   

 

 Hyperlipidemia, Mixed  2018 10:35AM   

 

 Hypertension, Benign Essential  2018 10:35AM   

 

 Constipation  2018 10:35AM   







Payers







 Insurance Name  Company Name  Plan Name  Plan Number  Policy Number  Policy 
Group Number  Start Date

 

    Medicare RHC Medicare RHC     [id number]     N/A

 

    Medicare Part A  Medicare - Lab/Xray     [id number]     N/A

 

    Humana  Humana     [id number]     N/A

 

    Medicare [place] B  [place]     [id number]     N/A







History of Encounters







 Visit Date  Visit Type  Provider

 

 2018  Office visit  Afua Mcqueen APRN

 

 2018  Nurse visit  Afua Mcqueen APRN

 

 2017  Nurse visit  Afua Mcqueen APRN

 

 8/3/2017  Office visit  Afua Mcqueen APRN

 

 2017  Office visit  Afua Mcqueen APRN

 

 2017  Laboratory  Afua Mcqueen APRN

 

 2017  Office visit  Afua Mcqueen APRN

 

 2011  Hospital  JESSICA Zavala MD

## 2018-03-23 NOTE — XMS REPORT
MU2 Ambulatory Summary

 Created on: 2018



Nay Goddard

External Reference #: [id number]

: 1929

Sex: Female



Demographics







 Address  [address] [place]  [address]

 

 Home Phone  [phone]

 

 Preferred Language  English

 

 Marital Status  Never 

 

 Nondenominational Affiliation  Unknown

 

 Race  White

 

 Ethnic Group  Not  or 





Author







 Author  Afua Mcqueen

 

 [facility]

 

 Address  [address]

[place], [place]  [address]



 

 Phone  [phone]







Care Team Providers







 Care Team Member Name  Role  Phone

 

 Afua Mcqueen  PCP  [phone]

 

 Afua Mcqueen  PreferredProvider  [phone]







Allergies and Adverse Reactions







 Name  Reaction  Notes

 

 No known allergies      







Plan of Treatment

Not available.



Medications







 Active 

 

 Name  Start Date  Estimated Completion Date  SIG  Comments

 

 carvedilol 25 mg oral tablet        take 1 tablet (25 mg) by oral route 2 
times per day with food   

 

 gemfibrozil 600 mg oral tablet        take 1 tablet (600 mg) by oral route 2 
times per day 30 minutes before morning and evening meal   

 

 levothyroxine 100 mcg oral tablet        take 1 tablet (100 mcg) by oral route 
once daily   

 

 trazodone 50 mg oral tablet        take 1 tablet (50 mg) by oral route once 
daily at bedtime   

 

 Vitamin D3 1,000 unit oral capsule        take 1 capsule by oral route daily   

 

 tramadol 50 mg oral tablet        take 1 tablet (50 mg) by oral route every 6 
hours as needed   

 

 fish oil with omega 3 600 mg / 300mg oral        take one capsule daily   

 

 loratadine 10 mg oral tablet        take 1 tablet (10 mg) by oral route once 
daily   

 

 cyanocobalamin (vitamin B-12) 1,000 mcg/mL injection solution  2017  3/21/
2018  inject 1 milliliter (1,000 mcg) by intramuscular route once a month for 
300 days   









  

 

 Name  Start Date  Expiration Date  SIG  Comments

 

 Symbicort 160-4.5 mcg/actuation inhalation HFA aerosol inhaler  2017  1 puff am and pm  and then rinse throat   

 

 Zithromax Z-Donnie 250 mg oral tablet  8/3/2017  2017  take 2 tablets (500 mg
) by oral route once daily for 1 day then 1 tablet (250 mg) by oral route once 
daily for 4 days   









 Discontinued 

 

 Name  Start Date  Discontinued Date  SIG  Comments

 

 tramadol 50 mg oral tablet     2017      







Problem List

Not available.



Vital Signs







 Date  Time  BP-Sys(mm[Hg]  BP-Dodie(mm[Hg])  HR(bpm)  RR(rpm)  Temp  WT  HT  HC  
BMI  BSA  BMI Percentile  O2 Sat(%)

 

 2017  3:13:00 PM  142 mmHg  90 mmHg  98 bpm  18 rpm  98.6 F  155 lbs  62 
in     28.35 kg/m2  1.75 m2     94 %

 

 2017  10:01:00 AM  112 mmHg  68 mmHg  54 bpm  18 rpm  97 F  154 lbs  62 
in     28.1667 kg/m  1.7481 m     93 %







Social History







 Name  Description  Comments

 

 retired      

 

 Tobacco  Former smoker  quit 25 years ago

 

 Alcohol  Never   

 

 Caffeine  Current every day   

 

 Exercises regularly     walking, gardening, cleaning house

 

       







History of Procedures







 Date Ordered  Description  Order Status

 

 2017 12:00 AM  URINALYSIS AUTO W/O SCOPE  Reviewed

 

 2017 12:00 AM  ASSAY OF CREATINE  Reviewed

 

 2017 12:00 AM  COMPLETE CBC AUTOMATED  Reviewed

 

 2017 12:00 AM  GLYCOSYLATED HEMOGLOBIN TEST  Reviewed

 

 2017 12:00 AM  COMPREHEN METABOLIC PANEL  Reviewed

 

 2017 12:00 AM  LIPID PANEL  Reviewed

 

 2017 12:00 AM  ASSAY OF MAGNESIUM  Reviewed

 

 2017 12:00 AM  ASSAY OF BLOOD/URIC ACID  Reviewed

 

 2017 12:00 AM  ASSAY TRIIODOTHYRONINE (T3)  Reviewed

 

 2017 12:00 AM  ASSAY THYROID STIM HORMONE  Reviewed

 

 2017 12:00 AM  ASSAY OF FREE THYROXINE  Reviewed

 

 2017 12:00 AM  VITAMIN D 25 HYDROXY  Reviewed

 

 2017 12:00 AM  VITAMIN B-12  Reviewed

 

 2017 12:00 AM  ASSAY OF FOLIC ACID SERUM  Reviewed

 

 2017 12:00 AM  ASSAY OF PARATHORMONE  Reviewed

 

 2017 12:00 AM  ROUTINE VENIPUNCTURE  Reviewed

 

 2017 12:00 AM  THER/PROPH/DIAG INJ SC/IM  Reviewed

 

 2017 12:00 AM  Depo-Medrol 40mg Injection  Reviewed

 

 8/3/2017 12:00 AM  THER/PROPH/DIAG INJ SC/IM  Reviewed

 

 8/3/2017 12:00 AM  Decadron 4mg Injection  Reviewed

 

 8/3/2017 12:00 AM  Depo-Medrol 40mg Injection  Reviewed

 

 2017 12:00 AM  THER/PROPH/DIAG INJ SC/IM  Reviewed

 

 2017 12:00 AM  B12 1000mcg Injection  Reviewed

 

 2018 12:00 AM  THERAPEUTIC PROPHYLACTIC/DX INJECTION SUBQ/IM  Reviewed







Results Summary







 Date and Description  Results

 

 2017 9:00 AM  FREE T4 0.71 TSH 4.630 uIU/mLT3 TOTAL 51.0 ng/dLVITAMIN B12 
606.0 pg/mLFOLATE 6.30 ng/mLPROTEIN UR <7 mg/dLCREAT UR RAND 18.90 mg/dLVITAMIN 
D 55.20 ng/mLHGB A1C 5.20 %Est Avg Glucose 102.5 mg/dLGLUCOSE 104.0 mg/dLSODIUM 
139.0 mmol/LPOTASSIUM 5.30 mmol/LCHLORIDE 103.0 mmol/LCO2 30.0 mmol/LBUN 23.0 mg
/dLCREATININE 1.60 mg/dLSGOT/AST 16.0 IU/LSGPT/ALT 7.0 IU/LALK PHOS 65.0 IU/
LTOTAL PROTEIN 6.60 g/dLALBUMIN 4.0 g/dLTOTAL BILI 0.30 mg/dLCALCIUM 9.0 mg/
dLAGE 87 GFR NonAA 30 GFR AA 36 eGFR 30 eGFR AA* 36 MAGNESIUM 2.0 mg/
dLTRIGLYCERIDES 145.0 mg/dLCHOLESTEROL 226.0 mg/dLHDL 27.0 mg/dLTOT CHOL/HDL 
8.4 LDL (CALC) 170.0 mg/dLURIC ACID 6.2 mg/dLWBC 4.9 RBC 3.42 HGB 10.40 g/dLHCT 
33.30 %MCV 97.0 fLMCH 30.40 pgMCHC 31.20 g/dLRDW SD 50 RDW CV 14.0 %MPV 11.50 
fLPLT 152 NRBC# 0.00 NRBC% 0.0 %NEUT 64.70 %%LYMP 19.90 %%MONO 7.30 %%EOS 6.70 %
%BASO 0.80 %#NEUT 3.18 #LYMP 0.98 #MONO 0.36 #EOS 0.33 #BASO 0.04 SEGS 66 BANDS 
1 LYMPHS 20 MONOS 8 EOS 5.0 %PTH, Intact 38 







History Of Immunizations

Not available.



History of Past Illness







 Name  Date of Onset  Comments

 

 Arthritis      

 

 Hyperlipemia      

 

 Kidney disease      

 

 Hypothyroidism      

 

 Seasonal allergic rhinitis due to pollen  May 25 2017 10:12AM   

 

 Hyperlipidemia, Mixed  May 25 2017 10:12AM   

 

 Chronic kidney disease, Stage III (moderate)  May 25 2017 10:12AM   

 

 Acquired hypothyroidism  May 25 2017 10:12AM   

 

 Pernicious anemia  May 25 2017 10:12AM   

 

 Vitamin D deficiency  May 25 2017 10:12AM   

 

 COPD (chronic obstructive pulmonary disease) with chronic bronchitis  May 25 
2017 10:12AM   

 

 Insomnia  May 25 2017 10:12AM   

 

 Arthritis  May 25 2017 10:12AM   

 

 Hyperparathyroidism due to renal insufficiency  May 25 2017 10:12AM   

 

 Hyperglycemia  May 25 2017 10:12AM   

 

 Benign essential HTN  May 25 2017 10:12AM   

 

 Acute kidney failure, unspecified  2017  9:36AM   

 

 Chronic kidney disease, stage 3 (moderate)  2017  9:36AM   

 

 Essential hypertension  2017  9:36AM   

 

 Blood glucose elevated  2017  9:36AM   

 

 Acquired hyperlipoproteinemia  2017  9:36AM   

 

 Acquired hypothyroidism  2017  9:36AM   

 

 Low vitamin D level  2017  9:36AM   

 

 [place] anemia  2017  9:36AM   

 

 Hyperparathyroidism, secondary renal  2017  9:36AM   

 

 Other vitamin B12 deficiency anemia  2017  3:16PM   

 

 DDD (degenerative disc disease), lumbar  2017  3:16PM   

 

 Sciatica of left side  2017  3:16PM   

 

 CKD (chronic kidney disease) stage 3, GFR 30-59 ml/min  2017  3:16PM   

 

 Acute seasonal allergic rhinitis due to pollen  Aug  3 2017 11:57AM   

 

 Acute bronchitis, unspecified organism  Aug  3 2017 11:57AM   

 

 B12 deficiency  2017 10:25AM   

 

 B12 deficiency  2018  1:05PM   







Payers







 Insurance Name  Company Name  Plan Name  Plan Number  Policy Number  Policy 
Group Number  Start Date

 

    Humana  Humana     [id number]     N/A

 

    Medicare [place] B  [place]     [id number]     N/A







History of Encounters







 Visit Date  Visit Type  Provider

 

 2018  Nurse visit  Afua Mcqueen APRN

 

 2017  Nurse visit  Afua Mcqueen APRN

 

 8/3/2017  Office visit  Afua Mcqueen APRN

 

 2017  Office visit  Afua Mcqueen APRN

 

 2017  Laboratory  Afua Mcqueen APRN

 

 2017  Office visit  Afua Mcqueen APRN

 

 2011  [facility]  JESSICA Zavala MD

## 2018-03-23 NOTE — XMS REPORT
MU2 Ambulatory Summary

 Created on: 2018



Nay Goddard

External Reference #: [id number]

: 1929

Sex: Female



Demographics







 Address  [address] [place]  [address]

 

 Home Phone  [phone]

 

 Preferred Language  English

 

 Marital Status  Never 

 

 Hinduism Affiliation  Unknown

 

 Race  White

 

 Ethnic Group  Not  or 





Author







 Author  Afua Mcqueen

 

 [facility]

 

 Address  [address]

[place], [place]  [address]



 

 Phone  [phone]







Care Team Providers







 Care Team Member Name  Role  Phone

 

 Afua Mcqueen  PCP  [phone]

 

 Afua Mcqueen  PreferredProvider  [phone]







Allergies and Adverse Reactions







 Name  Reaction  Notes

 

 No known allergies      







Plan of Treatment







 Planned Activity  Comments  Planned Date  Planned Time  Plan/Goal

 

 TSH     2018  12:00 AM   

 

 T4 FREE     2018  12:00 AM   







Medications







 Active 

 

 Name  Start Date  Estimated Completion Date  SIG  Comments

 

 levothyroxine 100 mcg oral tablet        take 1 tablet (100 mcg) by oral route 
once daily   

 

 Vitamin D3 1,000 unit oral capsule        take 1 capsule by oral route daily   

 

 fish oil with omega 3 600 mg / 300mg oral        take one capsule daily   

 

 cyanocobalamin (vitamin B-12) 1,000 mcg/mL injection solution  2017  3/21/
2018  inject 1 milliliter (1,000 mcg) by intramuscular route once a month for 
300 days   

 

 Namenda Titration Donnie 5-10 mg oral tablets,dose pack  2018     take as 
directed DAILY AS ON PACK   

 

 Celexa 20 mg oral tablet  2018  3/8/2018  take 1 tablet (20 mg) by oral 
route once daily in the morning for 30 days   

 

 carvedilol 25 mg oral tablet  2018  take 1 tablet (25 mg) by 
oral route  per day with food for 90 days   

 

 tramadol 50 mg oral tablet  2018  3/8/2018  take 1 tablet (50 mg) by oral 
route AT BEDTIME   









  

 

 Name  Start Date  Expiration Date  SIG  Comments

 

 Symbicort 160-4.5 mcg/actuation inhalation HFA aerosol inhaler  2017  1 puff am and pm  and then rinse throat   

 

 Zithromax Z-Donnie 250 mg oral tablet  8/3/2017  2017  take 2 tablets (500 mg
) by oral route once daily for 1 day then 1 tablet (250 mg) by oral route once 
daily for 4 days   









 Discontinued 

 

 Name  Start Date  Discontinued Date  SIG  Comments

 

 gemfibrozil 600 mg oral tablet     2018  take 1 tablet (600 mg) by oral 
route 2 times per day 30 minutes before morning and evening meal   

 

 tramadol 50 mg oral tablet     2017      

 

 trazodone 50 mg oral tablet     2018  take 1 tablet (50 mg) by oral route 
once daily at bedtime   

 

 loratadine 10 mg oral tablet     2018  take 1 tablet (10 mg) by oral route 
once daily   







Problem List

Not available.



Vital Signs







 Date  Time  BP-Sys(mm[Hg]  BP-Dodie(mm[Hg])  HR(bpm)  RR(rpm)  Temp  WT  HT  HC  
BMI  BSA  BMI Percentile  O2 Sat(%)

 

 2018  10:34:00 AM  132 mmHg  84 mmHg  58 bpm  18 rpm  97.6 F  145.375 lbs  
62 in     26.59 kg/m2  1.70 m2     97 %

 

 2017  3:13:00 PM  142 mmHg  90 mmHg  98 bpm  18 rpm  98.6 F  155 lbs  62 
in     28.3496 kg/m  1.7537 m     94 %

 

 2017  10:01:00 AM  112 mmHg  68 mmHg  54 bpm  18 rpm  97 F  154 lbs  62 
in     28.17 kg/m2  1.75 m2     93 %







Social History







 Name  Description  Comments

 

 retired      

 

 Tobacco  Former smoker  quit 25 years ago

 

 Alcohol  Never   

 

 Caffeine  Current every day   

 

 Exercises regularly     walking, gardening, cleaning house

 

       







History of Procedures







 Date Ordered  Description  Order Status

 

 2017 12:00 AM  URINALYSIS AUTO W/O SCOPE  Reviewed

 

 2017 12:00 AM  ASSAY OF CREATINE  Reviewed

 

 2017 12:00 AM  COMPLETE CBC AUTOMATED  Reviewed

 

 2017 12:00 AM  GLYCOSYLATED HEMOGLOBIN TEST  Reviewed

 

 2017 12:00 AM  COMPREHEN METABOLIC PANEL  Reviewed

 

 2017 12:00 AM  LIPID PANEL  Reviewed

 

 2017 12:00 AM  ASSAY OF MAGNESIUM  Reviewed

 

 2017 12:00 AM  ASSAY OF BLOOD/URIC ACID  Reviewed

 

 2017 12:00 AM  ASSAY TRIIODOTHYRONINE (T3)  Reviewed

 

 2017 12:00 AM  ASSAY THYROID STIM HORMONE  Reviewed

 

 2017 12:00 AM  ASSAY OF FREE THYROXINE  Reviewed

 

 2017 12:00 AM  VITAMIN D 25 HYDROXY  Reviewed

 

 2017 12:00 AM  VITAMIN B-12  Reviewed

 

 2017 12:00 AM  ASSAY OF FOLIC ACID SERUM  Reviewed

 

 2017 12:00 AM  ASSAY OF PARATHORMONE  Reviewed

 

 2017 12:00 AM  ROUTINE VENIPUNCTURE  Reviewed

 

 2017 12:00 AM  THER/PROPH/DIAG INJ SC/IM  Reviewed

 

 2017 12:00 AM  Depo-Medrol 40mg Injection  Reviewed

 

 8/3/2017 12:00 AM  THER/PROPH/DIAG INJ SC/IM  Reviewed

 

 8/3/2017 12:00 AM  Decadron 4mg Injection  Reviewed

 

 8/3/2017 12:00 AM  Depo-Medrol 40mg Injection  Reviewed

 

 2017 12:00 AM  THER/PROPH/DIAG INJ SC/IM  Reviewed

 

 2017 12:00 AM  B12 1000mcg Injection  Reviewed

 

 2018 12:00 AM  THERAPEUTIC PROPHYLACTIC/DX INJECTION SUBQ/IM  Reviewed

 

 2018 12:00 AM  B12 1000mcg Injection, RHC Medicare  Reviewed







Results Summary







 Date and Description  Results

 

 2017 9:00 AM  FREE T4 0.71 TSH 4.630 uIU/mLT3 TOTAL 51.0 ng/dLVITAMIN B12 
606.0 pg/mLFOLATE 6.30 ng/mLPROTEIN UR <7 mg/dLCREAT UR RAND 18.90 mg/dLVITAMIN 
D 55.20 ng/mLHGB A1C 5.20 %Est Avg Glucose 102.5 mg/dLGLUCOSE 104.0 mg/dLSODIUM 
139.0 mmol/LPOTASSIUM 5.30 mmol/LCHLORIDE 103.0 mmol/LCO2 30.0 mmol/LBUN 23.0 mg
/dLCREATININE 1.60 mg/dLSGOT/AST 16.0 IU/LSGPT/ALT 7.0 IU/LALK PHOS 65.0 IU/
LTOTAL PROTEIN 6.60 g/dLALBUMIN 4.0 g/dLTOTAL BILI 0.30 mg/dLCALCIUM 9.0 mg/
dLAGE 87 GFR NonAA 30 GFR AA 36 eGFR 30 eGFR AA* 36 MAGNESIUM 2.0 mg/
dLTRIGLYCERIDES 145.0 mg/dLCHOLESTEROL 226.0 mg/dLHDL 27.0 mg/dLTOT CHOL/HDL 
8.4 LDL (CALC) 170.0 mg/dLURIC ACID 6.2 mg/dLWBC 4.9 RBC 3.42 HGB 10.40 g/dLHCT 
33.30 %MCV 97.0 fLMCH 30.40 pgMCHC 31.20 g/dLRDW SD 50 RDW CV 14.0 %MPV 11.50 
fLPLT 152 NRBC# 0.00 NRBC% 0.0 %NEUT 64.70 %%LYMP 19.90 %%MONO 7.30 %%EOS 6.70 %
%BASO 0.80 %#NEUT 3.18 #LYMP 0.98 #MONO 0.36 #EOS 0.33 #BASO 0.04 SEGS 66 BANDS 
1 LYMPHS 20 MONOS 8 EOS 5.0 %PTH, Intact 38 







History Of Immunizations

Not available.



History of Past Illness







 Name  Date of Onset  Comments

 

 Arthritis      

 

 Hyperlipemia      

 

 Kidney disease      

 

 Hypothyroidism      

 

 Seasonal allergic rhinitis due to pollen  May 25 2017 10:12AM   

 

 Hyperlipidemia, Mixed  May 25 2017 10:12AM   

 

 Chronic kidney disease, Stage III (moderate)  May 25 2017 10:12AM   

 

 Acquired hypothyroidism  May 25 2017 10:12AM   

 

 Pernicious anemia  May 25 2017 10:12AM   

 

 Vitamin D deficiency  May 25 2017 10:12AM   

 

 COPD (chronic obstructive pulmonary disease) with chronic bronchitis  May 25 
2017 10:12AM   

 

 Insomnia  May 25 2017 10:12AM   

 

 Arthritis  May 25 2017 10:12AM   

 

 Hyperparathyroidism due to renal insufficiency  May 25 2017 10:12AM   

 

 Hyperglycemia  May 25 2017 10:12AM   

 

 Benign essential HTN  May 25 2017 10:12AM   

 

 Acute kidney failure, unspecified  2017  9:36AM   

 

 Chronic kidney disease, stage 3 (moderate)  2017  9:36AM   

 

 Essential hypertension  2017  9:36AM   

 

 Blood glucose elevated  2017  9:36AM   

 

 Acquired hyperlipoproteinemia  2017  9:36AM   

 

 Acquired hypothyroidism  2017  9:36AM   

 

 Low vitamin D level  2017  9:36AM   

 

 [place] anemia  2017  9:36AM   

 

 Hyperparathyroidism, secondary renal  2017  9:36AM   

 

 Other vitamin B12 deficiency anemia  2017  3:16PM   

 

 DDD (degenerative disc disease), lumbar  2017  3:16PM   

 

 Sciatica of left side  2017  3:16PM   

 

 CKD (chronic kidney disease) stage 3, GFR 30-59 ml/min  2017  3:16PM   

 

 Acute seasonal allergic rhinitis due to pollen  Aug  3 2017 11:57AM   

 

 Acute bronchitis, unspecified organism  Aug  3 2017 11:57AM   

 

 B12 deficiency  2017 10:25AM   

 

 B12 deficiency  2018  1:05PM   

 

 Alzheimer disease  2018 10:35AM   

 

 Hyperlipidemia, Mixed  2018 10:35AM   

 

 Hypertension, Benign Essential  2018 10:35AM   

 

 Constipation  2018 10:35AM   

 

 Hypothyroidism (acquired)  2018  6:13PM   







Payers







 Insurance Name  Company Name  Plan Name  Plan Number  Policy Number  Policy 
Group Number  Start Date

 

    Medicare R[employer]  Medicare [employer]     [id number]     N/A

 

    Medicare Part A  Medicare - Lab/Xray     [id number]     N/A

 

    Humana  Humana     [id number]     N/A

 

    Medicare [place] B  [place]     [id number]     N/A







History of Encounters







 Visit Date  Visit Type  Provider

 

 2018  Office visit  Afua Mcqueen APRN

 

 2018  Nurse visit  Afua Mcqueen APRN

 

 2017  Nurse visit  Afua Mcqueen APRN

 

 8/3/2017  Office visit  Afua Mcqueen APRN

 

 2017  Office visit  Afua Mcqueen APRN

 

 2017  Laboratory  Afua Mcqueen APRN

 

 2017  Office visit  Afua Mcqueen APRN

 

 2011  Alvaro Zavala MD

## 2018-03-23 NOTE — XMS REPORT
Patient Summary (HL7 CCD)

 Created on: 2016



VIRGILIO LA

External Reference #: [id number]

: 1929

Sex: Female



Demographics







 Address  [address] [place] [address]  [address]

 

 Home Phone  [phone]

 

 Preferred Language  English

 

 Marital Status  Unknown

 

 Denominational Affiliation  Unknown

 

 Race  White

 

 Ethnic Group  Not  or 





Author







 Author  KIANNA BROWNLEE

 

 Organization  Unknown

 

 Address  [address]

[place], [place]  [address]



 

 Phone  [phone]







Care Team Providers







 Care Team Member Name  Role  Phone

 

 [facility], TODD LICEA  Attphys  [phone]

 

 [facility], TODD LICEA  Prisurg  [phone]



                                            



Vital Signs

          Unknown or Not Available.                                            
                        



Allergies

          





 Allergy        Code        Allergy Type        Reaction        Status    

 

 No Known Drug Allergies        0        No known drug allergies               
  Active    



                                                                              



Procedures

          





 Procedure        Code        Procedure Type        Date    

 

 CX CHEST 1 VIEW        [id number]        SNOMED CT        2015    



                                                                              



History of Immunizations

          Unknown or Not Available.                                            
                                  



Problems

          Unknown or Not Available.                                            
                                            



Results

          Unknown or Not Available.                                            
                                            



Active Medications

          Unknown or Not Available.                                            
                        



Medications Administered During Visit

          Unknown or Not Available.                                            
                                  



Encounters

          





 Encounter Diagnosis        Diagnosis Code        Start Date    

 

 Acute bronchitis, unspecified        J209        2015    



                                                                    



Social History

          





 Smoking Status        Code        Start Date        End Date    

 

 Never smoker        [id number]                      



                                                                    



Patient Decision Aids

          Unknown or Not Available.                                            
              



Discharge Instructions

          





         



You were admitted to [facility] on 2015 with a principal 
diagnosis of Acute bronchitis, unspecified.        



You were discharged from [facility] on 2015.        



Should you have any questions prior to discharge, please contact a member of 
your healthcare team. If you have left the hospital and have any questions, 
please contact your primary care physician.          



                                                          



Chief Complaint and Reason For Visit

          





 Chief Complaint        Date of Onset    

 

 COUGH             



                                                          



Function Status

          Unknown or Not Available.                                            
              



Plan of Care

          Unknown or Not Available.                                            
              



Referral/Transition of Care

          Unknown or Not Available.

## 2018-03-23 NOTE — XMS REPORT
MU2 Ambulatory Summary

 Created on: 2017



Nay Goddard

External Reference #: [id number]

: 1929

Sex: Female



Demographics







 Address  [address] [place]  [address]

 

 Home Phone  [phone]

 

 Preferred Language  English

 

 Marital Status  Never 

 

 Sikhism Affiliation  Unknown

 

 Race  White

 

 Ethnic Group  Not  or 





Author







 Author  Afua Mcqueen

 

 [facility]

 

 Address  [address]

[place], [place]  [address]



 

 Phone  [phone]







Care Team Providers







 Care Team Member Name  Role  Phone

 

 Afua Mcqueen  PCP  [phone]

 

 Afua Mcqueen  PreferredProvider  [id number]







Allergies and Adverse Reactions







 Name  Reaction  Notes

 

 No known allergies      







Plan of Treatment

Not available.



Medications







 Active 

 

 Name  Start Date  Estimated Completion Date  SIG  Comments

 

 carvedilol 25 mg oral tablet        take 1 tablet (25 mg) by oral route 2 
times per day with food   

 

 gemfibrozil 600 mg oral tablet        take 1 tablet (600 mg) by oral route 2 
times per day 30 minutes before morning and evening meal   

 

 levothyroxine 100 mcg oral tablet        take 1 tablet (100 mcg) by oral route 
once daily   

 

 trazodone 50 mg oral tablet        take 1 tablet (50 mg) by oral route once 
daily at bedtime   

 

 Vitamin D3 1,000 unit oral capsule        take 1 capsule by oral route daily   

 

 tramadol 50 mg oral tablet        take 1 tablet (50 mg) by oral route every 6 
hours as needed   

 

 fish oil with omega 3 600 mg / 300mg oral        take one capsule daily   

 

 loratadine 10 mg oral tablet        take 1 tablet (10 mg) by oral route once 
daily   

 

 cyanocobalamin (vitamin B-12) 1,000 mcg/mL injection solution  2017  3/21/
2018  inject 1 milliliter (1,000 mcg) by intramuscular route once a month for 
300 days   









  

 

 Name  Start Date  Expiration Date  SIG  Comments

 

 Symbicort 160-4.5 mcg/actuation inhalation HFA aerosol inhaler  2017  1 puff am and pm  and then rinse throat   

 

 Zithromax Z-Donnie 250 mg oral tablet  8/3/2017  2017  take 2 tablets (500 mg
) by oral route once daily for 1 day then 1 tablet (250 mg) by oral route once 
daily for 4 days   









 Discontinued 

 

 Name  Start Date  Discontinued Date  SIG  Comments

 

 tramadol 50 mg oral tablet     2017      







Problem List

Not available.



Vital Signs







 Date  Time  BP-Sys(mm[Hg]  BP-Dodie(mm[Hg])  HR(bpm)  RR(rpm)  Temp  WT  HT  HC  
BMI  BSA  BMI Percentile  O2 Sat(%)

 

 2017  3:13:00 PM  142 mmHg  90 mmHg  98 bpm  18 rpm  98.6 F  155 lbs  62 
in     28.35 kg/m2  1.75 m2     94 %

 

 2017  10:01:00 AM  112 mmHg  68 mmHg  54 bpm  18 rpm  97 F  154 lbs  62 
in     28.1667 kg/m  1.7481 m     93 %







Social History







 Name  Description  Comments

 

 retired      

 

 Tobacco  Former smoker  quit 25 years ago

 

 Alcohol  Never   

 

 Caffeine  Current every day   

 

 Exercises regularly     walking, gardening, cleaning house

 

       







History of Procedures







 Date Ordered  Description  Order Status

 

 2017 12:00 AM  URINALYSIS AUTO W/O SCOPE  Reviewed

 

 2017 12:00 AM  ASSAY OF CREATINE  Reviewed

 

 2017 12:00 AM  COMPLETE CBC AUTOMATED  Reviewed

 

 2017 12:00 AM  GLYCOSYLATED HEMOGLOBIN TEST  Reviewed

 

 2017 12:00 AM  COMPREHEN METABOLIC PANEL  Reviewed

 

 2017 12:00 AM  LIPID PANEL  Reviewed

 

 2017 12:00 AM  ASSAY OF MAGNESIUM  Reviewed

 

 2017 12:00 AM  ASSAY OF BLOOD/URIC ACID  Reviewed

 

 2017 12:00 AM  ASSAY TRIIODOTHYRONINE (T3)  Reviewed

 

 2017 12:00 AM  ASSAY THYROID STIM HORMONE  Reviewed

 

 2017 12:00 AM  ASSAY OF FREE THYROXINE  Reviewed

 

 2017 12:00 AM  VITAMIN D 25 HYDROXY  Reviewed

 

 2017 12:00 AM  VITAMIN B-12  Reviewed

 

 2017 12:00 AM  ASSAY OF FOLIC ACID SERUM  Reviewed

 

 2017 12:00 AM  ASSAY OF PARATHORMONE  Reviewed

 

 2017 12:00 AM  ROUTINE VENIPUNCTURE  Reviewed

 

 2017 12:00 AM  THER/PROPH/DIAG INJ SC/IM  Reviewed

 

 2017 12:00 AM  Depo-Medrol 40mg Injection  Reviewed

 

 8/3/2017 12:00 AM  THER/PROPH/DIAG INJ SC/IM  Reviewed

 

 8/3/2017 12:00 AM  Decadron 4mg Injection  Reviewed

 

 8/3/2017 12:00 AM  Depo-Medrol 40mg Injection  Reviewed







Results Summary







 Date and Description  Results

 

 2017 9:00 AM  FREE T4 0.71 TSH 4.630 uIU/mLT3 TOTAL 51.0 ng/dLVITAMIN B12 
606.0 pg/mLFOLATE 6.30 ng/mLPROTEIN UR <7 mg/dLCREAT UR RAND 18.90 mg/dLVITAMIN 
D 55.20 ng/mLHGB A1C 5.20 %Est Avg Glucose 102.5 mg/dLGLUCOSE 104.0 mg/dLSODIUM 
139.0 mmol/LPOTASSIUM 5.30 mmol/LCHLORIDE 103.0 mmol/LCO2 30.0 mmol/LBUN 23.0 mg
/dLCREATININE 1.60 mg/dLSGOT/AST 16.0 IU/LSGPT/ALT 7.0 IU/LALK PHOS 65.0 IU/
LTOTAL PROTEIN 6.60 g/dLALBUMIN 4.0 g/dLTOTAL BILI 0.30 mg/dLCALCIUM 9.0 mg/
dLAGE 87 GFR NonAA 30 GFR AA 36 eGFR 30 eGFR AA* 36 MAGNESIUM 2.0 mg/
dLTRIGLYCERIDES 145.0 mg/dLCHOLESTEROL 226.0 mg/dLHDL 27.0 mg/dLTOT CHOL/HDL 
8.4 LDL (CALC) 170.0 mg/dLURIC ACID 6.2 mg/dLWBC 4.9 RBC 3.42 HGB 10.40 g/dLHCT 
33.30 %MCV 97.0 fLMCH 30.40 pgMCHC 31.20 g/dLRDW SD 50 RDW CV 14.0 %MPV 11.50 
fLPLT 152 NRBC# 0.00 NRBC% 0.0 %NEUT 64.70 %%LYMP 19.90 %%MONO 7.30 %%EOS 6.70 %
%BASO 0.80 %#NEUT 3.18 #LYMP 0.98 #MONO 0.36 #EOS 0.33 #BASO 0.04 SEGS 66 BANDS 
1 LYMPHS 20 MONOS 8 EOS 5.0 %PTH, Intact 38 







History Of Immunizations

Not available.



History of Past Illness







 Name  Date of Onset  Comments

 

 Arthritis      

 

 Hyperlipemia      

 

 Kidney disease      

 

 Hypothyroidism      

 

 Seasonal allergic rhinitis due to pollen  May 25 2017 10:12AM   

 

 Hyperlipidemia, Mixed  May 25 2017 10:12AM   

 

 Chronic kidney disease, Stage III (moderate)  May 25 2017 10:12AM   

 

 Acquired hypothyroidism  May 25 2017 10:12AM   

 

 Pernicious anemia  May 25 2017 10:12AM   

 

 Vitamin D deficiency  May 25 2017 10:12AM   

 

 COPD (chronic obstructive pulmonary disease) with chronic bronchitis  May 25 
2017 10:12AM   

 

 Insomnia  May 25 2017 10:12AM   

 

 Arthritis  May 25 2017 10:12AM   

 

 Hyperparathyroidism due to renal insufficiency  May 25 2017 10:12AM   

 

 Hyperglycemia  May 25 2017 10:12AM   

 

 Benign essential HTN  May 25 2017 10:12AM   

 

 Acute kidney failure, unspecified  2017  9:36AM   

 

 Chronic kidney disease, stage 3 (moderate)  2017  9:36AM   

 

 Essential hypertension  2017  9:36AM   

 

 Blood glucose elevated  2017  9:36AM   

 

 Acquired hyperlipoproteinemia  2017  9:36AM   

 

 Acquired hypothyroidism  2017  9:36AM   

 

 Low vitamin D level  2017  9:36AM   

 

 Floyd anemia  2017  9:36AM   

 

 Hyperparathyroidism, secondary renal  2017  9:36AM   

 

 Other vitamin B12 deficiency anemia  2017  3:16PM   

 

 DDD (degenerative disc disease), lumbar  2017  3:16PM   

 

 Sciatica of left side  2017  3:16PM   

 

 CKD (chronic kidney disease) stage 3, GFR 30-59 ml/min  2017  3:16PM   

 

 Acute seasonal allergic rhinitis due to pollen  Aug  3 2017 11:57AM   

 

 Acute bronchitis, unspecified organism  Aug  3 2017 11:57AM   







Payers







 Insurance Name  Company Name  Plan Name  Plan Number  Policy Number  Policy 
Group Number  Start Date

 

    Humana  Humana     [id number]     N/A

 

    Medicare Part B  [place]     [id number]     N/A







History of Encounters







 Visit Date  Visit Type  Provider

 

 8/3/2017  Office visit  Afua Mcqueen APRN

 

 2017  Office visit  Afua Mcqueen APRN

 

 2017  Laboratory  Afua Mcqueen APRN

 

 2017  Office visit  Afua Mcqueen APRN

 

 2011  Hospital  JESSICA Zavala MD

## 2018-03-23 NOTE — XMS REPORT
MU2 Ambulatory Summary

 Created on: 2018



Nay Goddard

External Reference #: [id number]

: 1929

Sex: Female



Demographics







 Address  [address] [place]  [address]

 

 Home Phone  [phone]

 

 Preferred Language  English

 

 Marital Status  Never 

 

 [employer] Affiliation  Unknown

 

 Race  White

 

 Ethnic Group  Not  or 





Author







 Author  Afua Mcqueen

 

 [facility]

 

 Address  [address]

[place], [place]  [address]



 

 Phone  [phone]







Care Team Providers







 Care Team Member Name  Role  Phone

 

 Afua Mcqueen  PCP  [phone]

 

 Afua Mcqueen  PreferredProvider  [phone]







Allergies and Adverse Reactions







 Name  Reaction  Notes

 

 No known allergies      







Plan of Treatment

Not available.



Medications







 Active 

 

 Name  Start Date  Estimated Completion Date  SIG  Comments

 

 carvedilol 25 mg oral tablet        take 1 tablet (25 mg) by oral route 2 
times per day with food   

 

 gemfibrozil 600 mg oral tablet        take 1 tablet (600 mg) by oral route 2 
times per day 30 minutes before morning and evening meal   

 

 levothyroxine 100 mcg oral tablet        take 1 tablet (100 mcg) by oral route 
once daily   

 

 trazodone 50 mg oral tablet        take 1 tablet (50 mg) by oral route once 
daily at bedtime   

 

 Vitamin D3 1,000 unit oral capsule        take 1 capsule by oral route daily   

 

 tramadol 50 mg oral tablet        take 1 tablet (50 mg) by oral route every 6 
hours as needed   

 

 fish oil with omega 3 600 mg / 300mg oral        take one capsule daily   

 

 loratadine 10 mg oral tablet        take 1 tablet (10 mg) by oral route once 
daily   

 

 cyanocobalamin (vitamin B-12) 1,000 mcg/mL injection solution  2017  3/21/
2018  inject 1 milliliter (1,000 mcg) by intramuscular route once a month for 
300 days   









  

 

 Name  Start Date  Expiration Date  SIG  Comments

 

 Symbicort 160-4.5 mcg/actuation inhalation HFA aerosol inhaler  2017  1 puff am and pm  and then rinse throat   

 

 Zithromax Z-Donnie 250 mg oral tablet  8/3/2017  2017  take 2 tablets (500 mg
) by oral route once daily for 1 day then 1 tablet (250 mg) by oral route once 
daily for 4 days   









 Discontinued 

 

 Name  Start Date  Discontinued Date  SIG  Comments

 

 tramadol 50 mg oral tablet     2017      







Problem List

Not available.



Vital Signs







 Date  Time  BP-Sys(mm[Hg]  BP-Dodie(mm[Hg])  HR(bpm)  RR(rpm)  Temp  WT  HT  HC  
BMI  BSA  BMI Percentile  O2 Sat(%)

 

 2017  3:13:00 PM  142 mmHg  90 mmHg  98 bpm  18 rpm  98.6 F  155 lbs  62 
in     28.35 kg/m2  1.75 m2     94 %

 

 2017  10:01:00 AM  112 mmHg  68 mmHg  54 bpm  18 rpm  97 F  154 lbs  62 
in     28.1667 kg/m  1.7481 m     93 %







Social History







 Name  Description  Comments

 

 retired      

 

 Tobacco  Former smoker  quit 25 years ago

 

 Alcohol  Never   

 

 Caffeine  Current every day   

 

 Exercises regularly     walking, gardening, cleaning house

 

       







History of Procedures







 Date Ordered  Description  Order Status

 

 2017 12:00 AM  URINALYSIS AUTO W/O SCOPE  Reviewed

 

 2017 12:00 AM  ASSAY OF CREATINE  Reviewed

 

 2017 12:00 AM  COMPLETE CBC AUTOMATED  Reviewed

 

 2017 12:00 AM  GLYCOSYLATED HEMOGLOBIN TEST  Reviewed

 

 2017 12:00 AM  COMPREHEN METABOLIC PANEL  Reviewed

 

 2017 12:00 AM  LIPID PANEL  Reviewed

 

 2017 12:00 AM  ASSAY OF MAGNESIUM  Reviewed

 

 2017 12:00 AM  ASSAY OF BLOOD/URIC ACID  Reviewed

 

 2017 12:00 AM  ASSAY TRIIODOTHYRONINE (T3)  Reviewed

 

 2017 12:00 AM  ASSAY THYROID STIM HORMONE  Reviewed

 

 2017 12:00 AM  ASSAY OF FREE THYROXINE  Reviewed

 

 2017 12:00 AM  VITAMIN D 25 HYDROXY  Reviewed

 

 2017 12:00 AM  VITAMIN B-12  Reviewed

 

 2017 12:00 AM  ASSAY OF FOLIC ACID SERUM  Reviewed

 

 2017 12:00 AM  ASSAY OF PARATHORMONE  Reviewed

 

 2017 12:00 AM  ROUTINE VENIPUNCTURE  Reviewed

 

 2017 12:00 AM  THER/PROPH/DIAG INJ SC/IM  Reviewed

 

 2017 12:00 AM  Depo-Medrol 40mg Injection  Reviewed

 

 8/3/2017 12:00 AM  THER/PROPH/DIAG INJ SC/IM  Reviewed

 

 8/3/2017 12:00 AM  Decadron 4mg Injection  Reviewed

 

 8/3/2017 12:00 AM  Depo-Medrol 40mg Injection  Reviewed

 

 2017 12:00 AM  THER/PROPH/DIAG INJ SC/IM  Reviewed

 

 2017 12:00 AM  B12 1000mcg Injection  Reviewed

 

 2018 12:00 AM  THERAPEUTIC PROPHYLACTIC/DX INJECTION SUBQ/IM  Reviewed

 

 2018 12:00 AM  B12 1000mcg Injection, [facility] Medicare  Reviewed







Results Summary







 Date and Description  Results

 

 2017 9:00 AM  FREE T4 0.71 TSH 4.630 uIU/mLT3 TOTAL 51.0 ng/dLVITAMIN B12 
606.0 pg/mLFOLATE 6.30 ng/mLPROTEIN UR <7 mg/dLCREAT UR RAND 18.90 mg/dLVITAMIN 
D 55.20 ng/mLHGB A1C 5.20 %Est Avg Glucose 102.5 mg/dLGLUCOSE 104.0 mg/dLSODIUM 
139.0 mmol/LPOTASSIUM 5.30 mmol/LCHLORIDE 103.0 mmol/LCO2 30.0 mmol/LBUN 23.0 mg
/dLCREATININE 1.60 mg/dLSGOT/AST 16.0 IU/LSGPT/ALT 7.0 IU/LALK PHOS 65.0 IU/
LTOTAL PROTEIN 6.60 g/dLALBUMIN 4.0 g/dLTOTAL BILI 0.30 mg/dLCALCIUM 9.0 mg/
dLAGE 87 GFR NonAA 30 GFR AA 36 eGFR 30 eGFR AA* 36 MAGNESIUM 2.0 mg/
dLTRIGLYCERIDES 145.0 mg/dLCHOLESTEROL 226.0 mg/dLHDL 27.0 mg/dLTOT CHOL/HDL 
8.4 LDL (CALC) 170.0 mg/dLURIC ACID 6.2 mg/dLWBC 4.9 RBC 3.42 HGB 10.40 g/dLHCT 
33.30 %MCV 97.0 fLMCH 30.40 pgMCHC 31.20 g/dLRDW SD 50 RDW CV 14.0 %MPV 11.50 
fLPLT 152 NRBC# 0.00 NRBC% 0.0 %NEUT 64.70 %%LYMP 19.90 %%MONO 7.30 %%EOS 6.70 %
%BASO 0.80 %#NEUT 3.18 #LYMP 0.98 #MONO 0.36 #EOS 0.33 #BASO 0.04 SEGS 66 BANDS 
1 LYMPHS 20 MONOS 8 EOS 5.0 %PTH, Intact 38 







History Of Immunizations

Not available.



History of Past Illness







 Name  Date of Onset  Comments

 

 Arthritis      

 

 Hyperlipemia      

 

 Kidney disease      

 

 Hypothyroidism      

 

 Seasonal allergic rhinitis due to pollen  May 25 2017 10:12AM   

 

 Hyperlipidemia, Mixed  May 25 2017 10:12AM   

 

 Chronic kidney disease, Stage III (moderate)  May 25 2017 10:12AM   

 

 Acquired hypothyroidism  May 25 2017 10:12AM   

 

 Pernicious anemia  May 25 2017 10:12AM   

 

 Vitamin D deficiency  May 25 2017 10:12AM   

 

 COPD (chronic obstructive pulmonary disease) with chronic bronchitis  May 25 
2017 10:12AM   

 

 Insomnia  May 25 2017 10:12AM   

 

 Arthritis  May 25 2017 10:12AM   

 

 Hyperparathyroidism due to renal insufficiency  May 25 2017 10:12AM   

 

 Hyperglycemia  May 25 2017 10:12AM   

 

 Benign essential HTN  May 25 2017 10:12AM   

 

 Acute kidney failure, unspecified  2017  9:36AM   

 

 Chronic kidney disease, stage 3 (moderate)  2017  9:36AM   

 

 Essential hypertension  2017  9:36AM   

 

 Blood glucose elevated  2017  9:36AM   

 

 Acquired hyperlipoproteinemia  2017  9:36AM   

 

 Acquired hypothyroidism  2017  9:36AM   

 

 Low vitamin D level  2017  9:36AM   

 

 [place] anemia  2017  9:36AM   

 

 Hyperparathyroidism, secondary renal  2017  9:36AM   

 

 Other vitamin B12 deficiency anemia  2017  3:16PM   

 

 DDD (degenerative disc disease), lumbar  2017  3:16PM   

 

 Sciatica of left side  2017  3:16PM   

 

 CKD (chronic kidney disease) stage 3, GFR 30-59 ml/min  2017  3:16PM   

 

 Acute seasonal allergic rhinitis due to pollen  Aug  3 2017 11:57AM   

 

 Acute bronchitis, unspecified organism  Aug  3 2017 11:57AM   

 

 B12 deficiency  2017 10:25AM   

 

 B12 deficiency  2018  1:05PM   







Payers







 Insurance Name  Company Name  Plan Name  Plan Number  Policy Number  Policy 
Group Number  Start Date

 

    Medicare [facility]  Medicare [facility]     [id number]     N/A

 

    Medicare Part A  Medicare - Lab/Xray     [id number]     N/A

 

    Humana  Humana     [id number]     N/A

 

    Medicare [place] B  [place]     [id number]     N/A







History of Encounters







 Visit Date  Visit Type  Provider

 

 2018  Nurse visit  Afua Mcqueen APRN

 

 2017  Nurse visit  Afua Mcqueen APRN

 

 8/3/2017  Office visit  Afua Mcqueen APRN

 

 2017  Office visit  Afua Mcqueen APRN

 

 2017  Laboratory  Afua VITALY Mcqueen APRN

 

 2017  Office visit  Afua Mcqueen APRN

 

 2011  [facility]  JESSICA Zavala MD

## 2018-03-23 NOTE — XMS REPORT
Patient Summary (HL7 CCD)

 Created on: 2016



VIRGILIO LA

External Reference #: [id number]

: 1929

Sex: Female



Demographics







 Address  [address] [place] [address]  [address]

 

 Home Phone  [phone]

 

 Preferred Language  English

 

 Marital Status  Unknown

 

 Religion Affiliation  Unknown

 

 Race  White

 

 Ethnic Group  Not  or 





Author







 Author  KIANNA BROWNLEE

 

 Organization  Unknown

 

 Address  [address]

[place], [place]  [address]



 

 Phone  [phone]







Care Team Providers







 Care Team Member Name  Role  Phone

 

 HAWK NORTH DO  Attphys  [phone]

 

 NORTHLINAR DO  Prisurg  [phone]



                                            



Vital Signs

          Unknown or Not Available.                                            
                        



Allergies

          





 Allergy        Code        Allergy Type        Reaction        Status    

 

 No Known Drug Allergies        0        No known drug allergies               
  Active    



                                                                              



Procedures

          





 Procedure        Code        Procedure Type        Date    

 

 ^CBC W/AUTO DIFF        3132314        SNOMED CT        2016    

 

 CBC W/ AUTO DIFF (RFLX MAN DIFF IF IND)        [id number]        SNOMED CT        
2016    

 

 COMPREHENSIVE METABOLIC PANEL        674606581        SNOMED CT        2016    

 

 LIPASE        56848359        SNOMED CT        2016    

 

 ABDOMEN 2 VIEW DECUB/UPRIGHT        525602026        SNOMED CT        2016    



                                                                               
                                       



History of Immunizations

          Unknown or Not Available.                                            
                                  



Problems

          Unknown or Not Available.                                            
                                            



Results

          







 COMPREHENSIVE METABOLIC PANEL - Collect Date/Time: 2016 13:50     

 

 Test Name        Code        Test Result        Test Units        Test Ref 
Range    

 

 GLUCOSE        2345-7        89        MG/DL        L=70       H=100    

 

 SODIUM        2951-2        139        MEQ/L        L=135      H=148    

 

 POTASSIUM        2823-3        4.6        MEQ/L        L=3.5      H=5.3    

 

 CHLORIDE        2075-0        104        MEQ/L        L=96       H=110    

 

 CO2        2028-9        25        MEQ/L        L=22       H=29    

 

 BUN        3094-0        22        MG/DL        L=8        H=22    

 

 CREATININE        2160-0        1.5        MG/DL        L=0.6      H=1.6    

 

 SGOT/AST        1920-8        16        IU/L        L=10       H=40    

 

 SGPT/ALT        1742-6        9        IU/L        L=8        H=54    

 

 ALK PHOS        6768-6        66        IU/L        L=35       H=115    

 

 TOTAL PROTEIN        2885-2        6.6        G/DL        L=5.5      H=8.5    

 

 ALBUMIN        1751-7        4.0        G/DL        L=3.1      H=5.4    

 

 TOTAL BILI        1975-2        0.3        MG/DL        L=0.0      H=1.5    

 

 CALCIUM        01042-4        9.6        MG/DL        L=8.2      H=10.6    

 

 AGE                 86        yrs             

 

 GFR NonAA                 33                      

 

 GFR AA                 40                      

 

 eGFR                 33        mL/min/1.7             

 

 eGFR AA*                 40        mL/min/1.7             











 LIPASE - Collect Date/Time: 2016 13:50     

 

 Test Name        Code        Test Result        Test Units        Test Ref 
Range    

 

 LIPASE        3040-3        41        U/L        L=8        H=78    











 CBC W/ AUTO DIFF (RFLX MAN DIFF IF IND) - Collect Date/Time: 2016 13:50 
    

 

 Test Name        Code        Test Result        Test Units        Test Ref 
Range    

 

 WBC        43775-3        5.4        TH/CMM        L=4.5      H=10.8    

 

 RBC        789-8        3.41        ML/CMM        L=4.20     H=5.40    

 

 HGB        718-7        10.6        G/DL        L=12.0     H=16.0    

 

 HCT        4544-3        32.1        %        L=37.0     H=47.0    

 

 MCV                 94        FL        L=81       H=99    

 

 MCH                 31.1        PG        L=27.0     H=33.0    

 

 MCHC                 33.0        G/DL        L=31.0     H=36.0    

 

 RDW SD                 48        FL        L=36       H=50    

 

 RDW CV                 14.1        %        L=0.0      H=14.8    

 

 MPV                 10.4        FL        L=9.3      H=12.5    

 

 PLT        777-3        157        TH/CMM        L=130      H=440    

 

 NRBC#                 0.00        TH/CMM        L=0.00     H=0.00    

 

 NRBC%                 0.0        /100WBC        L=0.0      H=2.0    

 

 %NEUT                 68.9        %             

 

 %LYMP                 20.2        %             

 

 %MONO                 7.2        %             

 

 %EOS                 3.1        %             

 

 %BASO                 0.6        %             

 

 #NEUT                 3.72        TH/CMM        L=2.10     H=8.20    

 

 #LYMP                 1.09        TH/CMM        L=0.90     H=5.20    

 

 #MONO                 0.39        TH/CMM        L=0.16     H=1.00    

 

 #EOS                 0.17        TH/CMM        L=0.00     H=0.80    

 

 #BASO                 0.03        TH/CMM        L=0.00     H=0.20    

 

 MANUAL DIFF                 NOT IND        N/A             



                                                                               
                             



Active Medications

          Unknown or Not Available.                                            
                        



Medications Administered During Visit

          Unknown or Not Available.                                            
                                  



Encounters

          





 Encounter Diagnosis        Diagnosis Code        Start Date    

 

 Noninfectious gastroenteritis         [id number]        2016    



                                                                    



Social History

          





 Smoking Status        Code        Start Date        End Date    

 

 Never smoker        [id number]                      



                                                                    



Patient Decision Aids

          Unknown or Not Available.                                            
              



Discharge Instructions

          







         



You were admitted to        [facility] on 2016 13:17 with a principal 
diagnosis of Noninfective gastroenteritis and colitis, unspecified        



You had the following tests done:                  CBC W/ AUTO DIFF (RFLX MAN 
DIFF IF IND)          COMPREHENSIVE METABOLIC PANEL          LIPASE            
    



You were discharged from        [facility] on 2016 15:07        



Should you have any questions prior to discharge, please contact a member of 
your healthcare team. If you have left the hospital and have any questions, 
please contact your primary care physician.          



                                                          



Chief Complaint and Reason For Visit

          





 Chief Complaint        Date of Onset    

 

 ABDOMINAL PAIN DIARRHEA ACHY             



                                                          



Function Status

          Unknown or Not Available.                                            
              



Plan of Care

          Unknown or Not Available.                                            
              



Referral/Transition of Care

          Unknown or Not Available.

## 2018-03-23 NOTE — XMS REPORT
MU2 Ambulatory Summary

 Created on: 2017



Nay Goddard

External Reference #: [id number]

: 1929

Sex: Female



Demographics







 Address  [address] [place]  [address]

 

 Home Phone  [phone]

 

 Preferred Language  English

 

 Marital Status  Never 

 

 Synagogue Affiliation  Unknown

 

 Race  White

 

 Ethnic Group  Not  or 





Author







 Author  Afua Mcqueen

 

 [facility]

 

 Address  [address]

[place], [place]  [address]



 

 Phone  [phone]







Care Team Providers







 Care Team Member Name  Role  Phone

 

 Afua Mcqueen  PCP  [phone]

 

 Afua Mcqueen  PreferredProvider  [id number]







Allergies and Adverse Reactions







 Name  Reaction  Notes

 

 No known allergies      







Plan of Treatment







 Planned Activity  Comments  Planned Date  Planned Time  Plan/Goal

 

 PROTEIN URINE DIPSTICK     2017  12:00 AM   







Medications







 Active 

 

 Name  Start Date  Estimated Completion Date  SIG  Comments

 

 carvedilol 25 mg oral tablet        take 1 tablet (25 mg) by oral route 2 
times per day with food   

 

 gemfibrozil 600 mg oral tablet        take 1 tablet (600 mg) by oral route 2 
times per day 30 minutes before morning and evening meal   

 

 levothyroxine 100 mcg oral tablet        take 1 tablet (100 mcg) by oral route 
once daily   

 

 trazodone 50 mg oral tablet        take 1 tablet (50 mg) by oral route once 
daily at bedtime   

 

 Vitamin D3 1,000 unit oral capsule        take 1 capsule by oral route daily   

 

 tramadol 50 mg oral tablet        take 1 tablet (50 mg) by oral route every 6 
hours as needed   

 

 fish oil with omega 3 600 mg / 300mg oral        take one capsule daily   

 

 loratadine 10 mg oral tablet        take 1 tablet (10 mg) by oral route once 
daily   

 

 Symbicort 160-4.5 mcg/actuation inhalation HFA aerosol inhaler  2017  1 puff am and pm  and then rinse throat   

 

 cyanocobalamin (vitamin B-12) 1,000 mcg/mL injection solution  2017  3/21/
2018  inject 1 milliliter (1,000 mcg) by intramuscular route once a month for 
300 days   









 Discontinued 

 

 Name  Start Date  Discontinued Date  SIG  Comments

 

 tramadol 50 mg oral tablet     2017      







Problem List

Not available.



Vital Signs







 Date  Time  BP-Sys(mm[Hg]  BP-Dodie(mm[Hg])  HR(bpm)  RR(rpm)  Temp  WT  HT  HC  
BMI  BSA  BMI Percentile  O2 Sat(%)

 

 2017  3:13:00 PM  142 mmHg  90 mmHg  98 bpm  18 rpm  98.6 F  155 lbs  62 
in     28.35 kg/m2  1.75 m2     94 %

 

 2017  10:01:00 AM  112 mmHg  68 mmHg  54 bpm  18 rpm  97 F  154 lbs  62 
in     28.1667 kg/m  1.7481 m     93 %







Social History







 Name  Description  Comments

 

 retired      

 

 Tobacco  Former smoker  quit 25 years ago

 

 Alcohol  Never   

 

 Caffeine  Current every day   

 

 Exercises regularly     walking, gardening, cleaning house

 

       







History of Procedures







 Date Ordered  Description  Order Status

 

 2017 12:00 AM  ASSAY OF CREATINE  Returned

 

 2017 12:00 AM  COMPLETE CBC AUTOMATED  Returned

 

 2017 12:00 AM  GLYCOSYLATED HEMOGLOBIN TEST  Returned

 

 2017 12:00 AM  COMPREHEN METABOLIC PANEL  Returned

 

 2017 12:00 AM  LIPID PANEL  Returned

 

 2017 12:00 AM  ASSAY OF MAGNESIUM  Returned

 

 2017 12:00 AM  ASSAY OF BLOOD/URIC ACID  Returned

 

 2017 12:00 AM  ASSAY TRIIODOTHYRONINE (T3)  Returned

 

 2017 12:00 AM  ASSAY THYROID STIM HORMONE  Returned

 

 2017 12:00 AM  ASSAY OF FREE THYROXINE  Returned

 

 2017 12:00 AM  VITAMIN D 25 HYDROXY  Returned

 

 2017 12:00 AM  VITAMIN B-12  Returned

 

 2017 12:00 AM  ASSAY OF FOLIC ACID SERUM  Returned

 

 2017 12:00 AM  ASSAY OF PARATHORMONE  Returned

 

 2017 12:00 AM  ROUTINE VENIPUNCTURE  Reviewed

 

 2017 12:00 AM  THER/PROPH/DIAG INJ SC/IM  Reviewed

 

 2017 12:00 AM  Depo-Medrol 40mg Injection  Reviewed







Results Summary







 Date and Description  Results

 

 2017 9:00 AM  FREE T4 0.71 TSH 4.630 uIU/mLT3 TOTAL 51.0 ng/dLVITAMIN B12 
606.0 pg/mLFOLATE 6.30 ng/mLPROTEIN UR <7 mg/dLCREAT UR RAND 18.90 mg/dLVITAMIN 
D 55.20 ng/mLHGB A1C 5.20 %Est Avg Glucose 102.5 mg/dLGLUCOSE 104.0 mg/dLSODIUM 
139.0 mmol/LPOTASSIUM 5.30 mmol/LCHLORIDE 103.0 mmol/LCO2 30.0 mmol/LBUN 23.0 mg
/dLCREATININE 1.60 mg/dLSGOT/AST 16.0 IU/LSGPT/ALT 7.0 IU/LALK PHOS 65.0 IU/
LTOTAL PROTEIN 6.60 g/dLALBUMIN 4.0 g/dLTOTAL BILI 0.30 mg/dLCALCIUM 9.0 mg/
dLAGE 87 GFR NonAA 30 GFR AA 36 eGFR 30 eGFR AA* 36 MAGNESIUM 2.0 mg/
dLTRIGLYCERIDES 145.0 mg/dLCHOLESTEROL 226.0 mg/dLHDL 27.0 mg/dLTOT CHOL/HDL 
8.4 LDL (CALC) 170.0 mg/dLURIC ACID 6.2 mg/dLPTH, Intact 38 







History Of Immunizations

Not available.



History of Past Illness







 Name  Date of Onset  Comments

 

 Arthritis      

 

 Hyperlipemia      

 

 Kidney disease      

 

 Hypothyroidism      

 

 Seasonal allergic rhinitis due to pollen  May 25 2017 10:12AM   

 

 Hyperlipidemia, Mixed  May 25 2017 10:12AM   

 

 Chronic kidney disease, Stage III (moderate)  May 25 2017 10:12AM   

 

 Acquired hypothyroidism  May 25 2017 10:12AM   

 

 Pernicious anemia  May 25 2017 10:12AM   

 

 Vitamin D deficiency  May 25 2017 10:12AM   

 

 COPD (chronic obstructive pulmonary disease) with chronic bronchitis  May 25 
2017 10:12AM   

 

 Insomnia  May 25 2017 10:12AM   

 

 Arthritis  May 25 2017 10:12AM   

 

 Hyperparathyroidism due to renal insufficiency  May 25 2017 10:12AM   

 

 Hyperglycemia  May 25 2017 10:12AM   

 

 Benign essential HTN  May 25 2017 10:12AM   

 

 Acute kidney failure, unspecified  2017  9:36AM   

 

 Chronic kidney disease, stage 3 (moderate)  2017  9:36AM   

 

 Essential hypertension  2017  9:36AM   

 

 Blood glucose elevated  2017  9:36AM   

 

 Acquired hyperlipoproteinemia  2017  9:36AM   

 

 Acquired hypothyroidism  2017  9:36AM   

 

 Low vitamin D level  2017  9:36AM   

 

 Floyd anemia  2017  9:36AM   

 

 Hyperparathyroidism, secondary renal  2017  9:36AM   

 

 Other vitamin B12 deficiency anemia  2017  3:16PM   

 

 DDD (degenerative disc disease), lumbar  2017  3:16PM   

 

 Sciatica of left side  2017  3:16PM   

 

 CKD (chronic kidney disease) stage 3, GFR 30-59 ml/min  2017  3:16PM   







Payers







 Insurance Name  Company Name  Plan Name  Plan Number  Policy Number  Policy 
Group Number  Start Date

 

    [employer]  [employer]     [id number]     N/A

 

    Medicare Part B  [employer] [place]     [id number]     N/A







History of Encounters







 Visit Date  Visit Type  Provider

 

 2017  Office visit  Afua Mcqueen APRN

 

 2017  Laboratory  Afua Mcqueen APRN

 

 2017  Office visit  Afua Mcqueen APRN

 

 2011  [facility]  JESSICA Zavala MD

## 2018-03-25 ENCOUNTER — HOSPITAL ENCOUNTER (EMERGENCY)
Dept: HOSPITAL 75 - ER | Age: 83
Discharge: NURSING, REHAB OR LONG-TERM CARE | End: 2018-03-25
Payer: MEDICARE

## 2018-03-25 VITALS — SYSTOLIC BLOOD PRESSURE: 137 MMHG | DIASTOLIC BLOOD PRESSURE: 31 MMHG

## 2018-03-25 VITALS — WEIGHT: 180 LBS | BODY MASS INDEX: 31.89 KG/M2 | HEIGHT: 63 IN

## 2018-03-25 DIAGNOSIS — E03.9: ICD-10-CM
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
## 2018-03-25 DIAGNOSIS — F32.9: ICD-10-CM

## 2018-03-25 DIAGNOSIS — R40.2362: ICD-10-CM

## 2018-03-25 DIAGNOSIS — R26.89: Primary | ICD-10-CM

## 2018-03-25 DIAGNOSIS — R40.2142: ICD-10-CM

## 2018-03-25 DIAGNOSIS — R40.2242: ICD-10-CM

## 2018-03-25 DIAGNOSIS — F41.9: ICD-10-CM

## 2018-03-25 DIAGNOSIS — J98.11: ICD-10-CM

## 2018-03-25 DIAGNOSIS — I10: ICD-10-CM

## 2018-03-25 DIAGNOSIS — Z99.81: ICD-10-CM
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
## 2018-03-25 DIAGNOSIS — F03.90: ICD-10-CM

## 2018-03-25 LAB
ALBUMIN SERPL-MCNC: 3.7 GM/DL (ref 3.2–4.5)
ALP SERPL-CCNC: 46 U/L (ref 40–136)
ALT SERPL-CCNC: 11 U/L (ref 0–55)
BASOPHILS # BLD AUTO: 0 10^3/UL (ref 0–0.1)
BASOPHILS NFR BLD AUTO: 0 % (ref 0–10)
BILIRUB SERPL-MCNC: 0.5 MG/DL (ref 0.1–1)
BUN/CREAT SERPL: 24
CALCIUM SERPL-MCNC: 8.6 MG/DL (ref 8.5–10.1)
CHLORIDE SERPL-SCNC: 105 MMOL/L (ref 98–107)
CO2 SERPL-SCNC: 27 MMOL/L (ref 21–32)
CREAT SERPL-MCNC: 1.87 MG/DL (ref 0.6–1.3)
EOSINOPHIL # BLD AUTO: 0.2 10^3/UL (ref 0–0.3)
EOSINOPHIL NFR BLD AUTO: 3 % (ref 0–10)
ERYTHROCYTE [DISTWIDTH] IN BLOOD BY AUTOMATED COUNT: 16.1 % (ref 10–14.5)
GFR SERPLBLD BASED ON 1.73 SQ M-ARVRAT: 25 ML/MIN
GLUCOSE SERPL-MCNC: 113 MG/DL (ref 70–105)
HCT VFR BLD CALC: 33 % (ref 35–52)
HGB BLD-MCNC: 10.2 G/DL (ref 11.5–16)
LYMPHOCYTES # BLD AUTO: 0.5 X 10^3 (ref 1–4)
LYMPHOCYTES NFR BLD AUTO: 9 % (ref 12–44)
MANUAL DIFFERENTIAL PERFORMED BLD QL: NO
MCH RBC QN AUTO: 32 PG (ref 25–34)
MCHC RBC AUTO-ENTMCNC: 31 G/DL (ref 32–36)
MCV RBC AUTO: 105 FL (ref 80–99)
MONOCYTES # BLD AUTO: 0.7 X 10^3 (ref 0–1)
MONOCYTES NFR BLD AUTO: 12 % (ref 0–12)
NEUTROPHILS # BLD AUTO: 4.5 X 10^3 (ref 1.8–7.8)
NEUTROPHILS NFR BLD AUTO: 76 % (ref 42–75)
PLATELET # BLD: 120 10^3/UL (ref 130–400)
PMV BLD AUTO: 10.1 FL (ref 7.4–10.4)
POTASSIUM SERPL-SCNC: 5.8 MMOL/L (ref 3.6–5)
PROT SERPL-MCNC: 6.3 GM/DL (ref 6.4–8.2)
RBC # BLD AUTO: 3.19 10^6/UL (ref 4.35–5.85)
SODIUM SERPL-SCNC: 139 MMOL/L (ref 135–145)
WBC # BLD AUTO: 6 10^3/UL (ref 4.3–11)

## 2018-03-25 PROCEDURE — 71045 X-RAY EXAM CHEST 1 VIEW: CPT

## 2018-03-25 PROCEDURE — 86141 C-REACTIVE PROTEIN HS: CPT

## 2018-03-25 PROCEDURE — 36415 COLL VENOUS BLD VENIPUNCTURE: CPT

## 2018-03-25 PROCEDURE — 96365 THER/PROPH/DIAG IV INF INIT: CPT

## 2018-03-25 PROCEDURE — 96361 HYDRATE IV INFUSION ADD-ON: CPT

## 2018-03-25 PROCEDURE — 80053 COMPREHEN METABOLIC PANEL: CPT

## 2018-03-25 PROCEDURE — 72125 CT NECK SPINE W/O DYE: CPT

## 2018-03-25 PROCEDURE — 94664 DEMO&/EVAL PT USE INHALER: CPT

## 2018-03-25 PROCEDURE — 94640 AIRWAY INHALATION TREATMENT: CPT

## 2018-03-25 PROCEDURE — 85025 COMPLETE CBC W/AUTO DIFF WBC: CPT

## 2018-03-25 PROCEDURE — 70450 CT HEAD/BRAIN W/O DYE: CPT

## 2018-03-25 NOTE — DIAGNOSTIC IMAGING REPORT
INDICATION: Fall.



COMPARISON: No prior examination is available for comparison.



EXAMINATION: Single view of the chest was obtained.



FINDINGS: There is cardiomegaly. There is some right basilar

atelectasis and/or pneumonitis. No pleural effusion or

pneumothorax. The mediastinum is unremarkable. 



IMPRESSION: Cardiomegaly and some right basilar atelectasis

and/or pneumonitis. 



Dictated by: 



  Dictated on workstation # [id number]

## 2018-03-25 NOTE — ED FALL/INJURY
General


Chief Complaint:  Trauma-Non Activation


Stated Complaint:  FALL


Nursing Triage Note:  


fall


Source:  patient, EMS, other (Mariela at [facility])


Exam Limitations:  other (dementia)





History of Present Illness


Date Seen by Provider:  Mar 25, 2018


Time Seen by Provider:  19:15


Initial Comments


88-year-old female patient presents to the emergency department with complaints 

of falling earlier today that was witnessed by nursing staff at [facility].  Patient is known to this examiner for routine medical care at [facility] 

for Dr. Rowan.  Patient reportedly lost her balance and fell hitting the left 

side on the doorjam/wall.  Staff denies LOC.  Patient denies pain.  Patient was 

started on oral antibiotics today for a UTI.


Location Injury Occurred:  [facility]


Occurred:  this evening


Injuries/Pain Location:  no injury


Context:  lost balance


Loss of Consciousness:  no loss of consciousness





Allergies and Home Medications


Allergies


Coded Allergies:  


     No Known Drug Allergies (Unverified , 3/23/18)





Constitutional:  no symptoms reported, No chills, No fever


Eyes:  No Symptoms Reported


Ears, Nose, Mouth, Throat:  no symptoms reported


Respiratory:  No cough, No phlegm, No short of breath


Cardiovascular:  No chest pain, No edema, No syncope


Gastrointestinal:  No abdominal pain, No constipation, No diarrhea, No loss of 

appetite, No nausea, No vomiting


Genitourinary:  no symptoms reported


Musculoskeletal:  No back pain, No joint pain, No neck pain


Skin:  No change in color, No lumps


Psychiatric/Neurological:  Denies Headache, Denies Numbness, Denies Paresthesia

, Denies Seizure, Denies Tingling, Denies Weakness


All Other Systems Reviewed


Negative Unless Noted:  Yes (Negative excepted noted.)





Past Medical-Social-Family Hx


Patient Social History


Alcohol Use:  Denies Use


Recreational Drug Use:  No


Smoking Status:  Never a Smoker


Recent Foreign Travel:  No


Contact w/Someone Who Travel:  No


Recent Infectious Disease Expo:  No


Recent Hopitalizations:  No





Immunizations Up To Date


Tetanus Booster (TDap):  Unknown


PED Vaccines UTD:  Yes





Seasonal Allergies


Seasonal Allergies:  No





Surgeries


History of Surgeries:  Yes





Respiratory


History of Respiratory Disorde:  No





Cardiovascular


History of Cardiac Disorders:  Yes


Cardiac Disorders:  Hypertension





Neurological


History of Neurological Disord:  Yes


Neurological Disorders:  Dementia





Genitourinary


History of Genitourinary Disor:  Yes


Genitourinary Disorders:  UTI-Chronic





Gastrointestinal


History of Gastrointestinal Di:  Yes


Gastrointestinal Disorders:  Chronic Constipation





Musculoskeletal


History of Musculoskeletal Dis:  No





Endocrine


History of Endocrine Disorders:  Yes


Endocrine Disorders:  Hypothyroidsim





HEENT


History of HEENT Disorders:  No





Cancer


History of Cancer:  No





Psychosocial


History of Psychiatric Problem:  Yes


Behavioral Health Disorders:  Anxiety, Depression





Integumentary


History of Skin or Integumenta:  No





Reviewed Nursing Assessment


Reviewed/Agree w Nursing PMH:  Yes





Family Medical History


Significant Family History:  No Pertinent Family Hx





Physical Exam


Vital Signs





Vital Signs - First Documented








 3/25/18





 19:15


 


Temp 97.9


 


Pulse 72


 


Resp 12


 


B/P (MAP) 120/69 (86)


 


Pulse Ox 94


 


O2 Delivery Nasal Cannula


 


O2 Flow Rate 3.00





Capillary Refill : Less Than 3 Seconds


General Appearance:  WD/WN, no apparent distress


HEENT:  PERRL/EOMI, normal ENT inspection, TMs normal, pharynx normal


Neck:  non-tender, full range of motion, supple, normal inspection


Cardiovascular:  normal peripheral pulses, regular rate, rhythm, no edema, no 

gallop, no murmur


Respiratory:  chest non-tender, no respiratory distress, no accessory muscle use

, rales, wheezing, expiration


Peripheral Pulses:  2+ Carotid (R), 2+ Carotid (L), 2+ Dorsalis Pedis (R), 2+ 

Left Dors-Pedis (L), 2+ Radial Pulses (R), 2+ Radial Pulses (L)


Gastrointestinal:  normal bowel sounds, non tender, soft, no organomegaly, No 

distended


Extremities:  normal range of motion, non-tender, normal inspection, no pedal 

edema, no calf tenderness, normal capillary refill


Neurologic/Psychiatric:  CNs II-XII nml as tested, no motor/sensory deficits, 

alert, normal mood/affect, other (oriented to self (similar to previous exams 

at the NH).)


Skin:  normal color, warm/dry, No ecchymosis





Dawson Coma Score


Best Eye Response:  (4) Open Spontaneously


Best Verbal Response:  (4) Confused Conversation (similar to usual exams at the 

NH)


Best Motor Response:  (6) Obeys Commands


[place] Total:  14





Progress/Results/Core Measures


Results/Orders


Lab Results





Laboratory Tests








Test


  3/25/18


19:35 Range/Units


 


 


White Blood Count


  6.0 


  4.3-11.0


10^3/uL


 


Red Blood Count


  3.19 L


  4.35-5.85


10^6/uL


 


Hemoglobin 10.2 L 11.5-16.0  G/DL


 


Hematocrit 33 L 35-52  %


 


Mean Corpuscular Volume 105 H 80-99  FL


 


Mean Corpuscular Hemoglobin 32  25-34  PG


 


Mean Corpuscular Hemoglobin


Concent 31 L


  32-36  G/DL


 


 


Red Cell Distribution Width 16.1 H 10.0-14.5  %


 


Platelet Count


  120 L


  130-400


10^3/uL


 


Mean Platelet Volume 10.1  7.4-10.4  FL


 


Neutrophils (%) (Auto) 76 H 42-75  %


 


Lymphocytes (%) (Auto) 9 L 12-44  %


 


Monocytes (%) (Auto) 12  0-12  %


 


Eosinophils (%) (Auto) 3  0-10  %


 


Basophils (%) (Auto) 0  0-10  %


 


Neutrophils # (Auto) 4.5  1.8-7.8  X 10^3


 


Lymphocytes # (Auto) 0.5 L 1.0-4.0  X 10^3


 


Monocytes # (Auto) 0.7  0.0-1.0  X 10^3


 


Eosinophils # (Auto)


  0.2 


  0.0-0.3


10^3/uL


 


Basophils # (Auto)


  0.0 


  0.0-0.1


10^3/uL


 


Sodium Level 139  135-145  MMOL/L


 


Potassium Level 5.8 H 3.6-5.0  MMOL/L


 


Chloride Level 105    MMOL/L


 


Carbon Dioxide Level 27  21-32  MMOL/L


 


Anion Gap 7  5-14  MMOL/L


 


Blood Urea Nitrogen 44 H 7-18  MG/DL


 


Creatinine


  1.87 H


  0.60-1.30


MG/DL


 


Estimat Glomerular Filtration


Rate 25 


   


 


 


BUN/Creatinine Ratio 24   


 


Glucose Level 113 H   MG/DL


 


Calcium Level 8.6  8.5-10.1  MG/DL


 


Total Bilirubin 0.5  0.1-1.0  MG/DL


 


Aspartate Amino Transf


(AST/SGOT) 15 


  5-34  U/L


 


 


Alanine Aminotransferase


(ALT/SGPT) 11 


  0-55  U/L


 


 


Alkaline Phosphatase 46    U/L


 


C-Reactive Protein High


Sensitivity 2.83 H


  0.00-0.50


MG/DL


 


Total Protein 6.3 L 6.4-8.2  GM/DL


 


Albumin 3.7  3.2-4.5  GM/DL








CAIN Carey


Ct Head/Cervical Spine Wo (3/25/18 19:22)


Saline Lock/Iv-Start (3/25/18 19:22)


Chest 1 View, Ap/Pa Only (3/25/18 19:22)


Saline Lock/Iv-Start (3/25/18 19:22)


Cbc With Automated Diff (3/25/18 19:22)


Comprehensive Metabolic Panel (3/25/18 19:22)


Hs C Reactive Protein (3/25/18 19:22)


Albuterol/Ipra Inhalation Soln (Duoneb I (3/25/18 19:30)


Svn Sm Volume Nebulizer Rt-Rfs (3/25/18 19:22)


Ns Iv 1000 Ml (Sodium Chloride 0.9%) (3/25/18 20:16)


Ceftriaxone Injection (Rocephin Injectio (3/25/18 20:30)





Medications Given in ED





Current Medications








 Medications  Dose


 Ordered  Sig/Toy


 Route  Start Time


 Stop Time Status Last Admin


Dose Admin


 


 Albuterol/


 Ipratropium  3 ml  ONCE  ONCE


 INH  3/25/18 19:30


 3/25/18 19:31 DC 3/25/18 19:31


3 ML








Vital Signs/I&O





Vital Sign - Last 12Hours








 3/25/18 3/25/18





 19:15 19:31


 


Temp 97.9 


 


Pulse 72 


 


Resp 12 


 


B/P (MAP) 120/69 (86) 


 


Pulse Ox 94 94


 


O2 Delivery Nasal Cannula Nasal Cannula


 


O2 Flow Rate 3.00 3.00














Blood Pressure Mean:  86











Diagnostic Imaging





   Diagonstic Imaging:  CT


   Plain Films/CT/US/NM/MRI:  c-spine, head


Comments


FINDINGS: The is prominence of the ventricles and sulci. There is mild chronic 

microvascular ischemic disease. There is no hydrocephalus. There is no midline 

shift. There is no intracranial mass, hemorrhage or extra-axial fluid 

collection. The calvarium is intact. The sinuses and mastoid air cells are 

clear. Evaluation of the cervical spine is limited due to motion. There is 

straightening of normal cervical lordosis. Vertebral body heights are well-

maintained. There is posterior facet arthropathy. There is no definite acute 

fracture or traumatic subluxation. Prevertebral soft tissues are within normal 

limits. The lung apices are clear. IMPRESSION: Atrophy and some chronic 

microvascular ischemic disease, however, no acute intracranial abnormality. The 

evaluation of the cervical spine is limited due to motion. There is some 

moderate spondylosis, however, no definite acute fracture or traumatic 

subluxation. Dictated by: Dictated on workstation # [id number]


   Reviewed:  Reviewed by Me (radiology report reviewed by me)








   Diagonstic Imaging:  Xray


   Plain Films/CT/US/NM/MRI:  chest


Comments


FINDINGS: There is cardiomegaly. There is some right basilar atelectasis and/or 

pneumonitis. No pleural effusion or pneumothorax. The mediastinum is 

unremarkable. IMPRESSION: Cardiomegaly and some right basilar atelectasis and/

or pneumonitis. Dictated by: Dictated on workstation # [id number]


   Reviewed:  Reviewed by Me (radiology report reviewed by me)





Departure


Impression


Impression:  


 Primary Impression:  


 Fall


 Qualified Codes:  W19.XXXA - Unspecified fall, initial encounter


 Additional Impression:  


 Atelectasis of right lung


Disposition:  [facility] XFER [facility]


Condition:  Stable





Departure-Patient Inst.


Decision time for Depature:  20:34


Referrals:  


KWABENA ROWAN MD (PCP/Family)


Primary Care Physician


Patient Instructions:  Atelectasis, Preventing Falls in the Older Adult





Add. Discharge Instructions:  


All discharge instructions reviewed with patient and/or family. Voiced 

understanding.


1- Medications as instructed.


2- Continue all current medications and orders.


3- Continue usual diet.


4- O2 to keep SaO2 >/= 90%


5- push fluids.


6- Mucinex 1 tab po BID prn congestion.


7- I will see Nay this week at the facility for recheck.


8-  Return to the emergency department for worsened symptoms, SOA, dizziness, 

fever, chest pain, changes in behavior, slurred speech, facial drooping, or any 

other concerns.  


9- Front Wheel Walker to use for assistance with ambulation.


Scripts


Prednisone (Prednisone) 20 Mg Tab


20 MG PO BID, #8 TAB 0 Refills


   Prov: CAIN MOORE         3/25/18 


Albuterol Sulfate (Albuterol Sulfate) 2.5 Mg/3 Ml Vial.neb


2.5 MG IH UD, #25 EA 0 Refills


   Prov: CAIN MOORE         3/25/18











CAIN MOORE Mar 25, 2018 19:38

## 2018-03-25 NOTE — DIAGNOSTIC IMAGING REPORT
PROCEDURE: CT head and CT cervical spine without contrast.



TECHNIQUE: Multiple contiguous axial images were obtained through

the brain and cervical spine without the use of intravenous

contrast. Sagittal and coronal reformations through the cervical

spine were then performed.



INDICATION:  Head and neck pain.



Comparison is made with prior examination from 03/23/2018.



FINDINGS: The is prominence of the ventricles and sulci. There is

mild chronic microvascular ischemic disease. There is no

hydrocephalus. There is no midline shift. There is no

intracranial mass, hemorrhage or extra-axial fluid collection.

The calvarium is intact. The sinuses and mastoid air cells are

clear.



Evaluation of the cervical spine is limited due to motion. There

is straightening of normal cervical lordosis. Vertebral body

heights are well-maintained. There is posterior facet

arthropathy. There is no definite acute fracture or traumatic

subluxation. Prevertebral soft tissues are within normal limits.

The lung apices are clear.



IMPRESSION: Atrophy and some chronic microvascular ischemic

disease, however, no acute intracranial abnormality.



The evaluation of the cervical spine is limited due to motion.

There is some moderate spondylosis, however, no definite acute

fracture or traumatic subluxation.



 



Dictated by: 



  Dictated on workstation # [id number]

## 2018-03-27 ENCOUNTER — HOSPITAL ENCOUNTER (EMERGENCY)
Dept: HOSPITAL 75 - ER | Age: 83
Discharge: HOME | End: 2018-03-27
Payer: MEDICARE

## 2018-03-27 VITALS — BODY MASS INDEX: 31.89 KG/M2 | WEIGHT: 180 LBS | HEIGHT: 63 IN

## 2018-03-27 VITALS — DIASTOLIC BLOOD PRESSURE: 64 MMHG | SYSTOLIC BLOOD PRESSURE: 137 MMHG

## 2018-03-27 DIAGNOSIS — E03.9: ICD-10-CM

## 2018-03-27 DIAGNOSIS — F41.9: ICD-10-CM

## 2018-03-27 DIAGNOSIS — Z79.52: ICD-10-CM

## 2018-03-27 DIAGNOSIS — F32.9: ICD-10-CM

## 2018-03-27 DIAGNOSIS — F03.90: ICD-10-CM

## 2018-03-27 DIAGNOSIS — I10: ICD-10-CM

## 2018-03-27 DIAGNOSIS — J44.1: Primary | ICD-10-CM

## 2018-03-27 DIAGNOSIS — K59.09: ICD-10-CM

## 2018-03-27 LAB
ALBUMIN SERPL-MCNC: 3.4 GM/DL (ref 3.2–4.5)
ALP SERPL-CCNC: 42 U/L (ref 40–136)
ALT SERPL-CCNC: 13 U/L (ref 0–55)
BASOPHILS # BLD AUTO: 0 10^3/UL (ref 0–0.1)
BASOPHILS NFR BLD AUTO: 0 % (ref 0–10)
BILIRUB SERPL-MCNC: 0.4 MG/DL (ref 0.1–1)
BUN/CREAT SERPL: 24
CALCIUM SERPL-MCNC: 8.4 MG/DL (ref 8.5–10.1)
CHLORIDE SERPL-SCNC: 107 MMOL/L (ref 98–107)
CO2 SERPL-SCNC: 27 MMOL/L (ref 21–32)
CREAT SERPL-MCNC: 1.92 MG/DL (ref 0.6–1.3)
EOSINOPHIL # BLD AUTO: 0.1 10^3/UL (ref 0–0.3)
EOSINOPHIL NFR BLD AUTO: 2 % (ref 0–10)
ERYTHROCYTE [DISTWIDTH] IN BLOOD BY AUTOMATED COUNT: 16.2 % (ref 10–14.5)
GFR SERPLBLD BASED ON 1.73 SQ M-ARVRAT: 25 ML/MIN
GLUCOSE SERPL-MCNC: 107 MG/DL (ref 70–105)
HCT VFR BLD CALC: 30 % (ref 35–52)
HGB BLD-MCNC: 9.1 G/DL (ref 11.5–16)
LYMPHOCYTES # BLD AUTO: 0.4 X 10^3 (ref 1–4)
LYMPHOCYTES NFR BLD AUTO: 9 % (ref 12–44)
MAGNESIUM SERPL-MCNC: 1.8 MG/DL (ref 1.8–2.4)
MANUAL DIFFERENTIAL PERFORMED BLD QL: NO
MCH RBC QN AUTO: 32 PG (ref 25–34)
MCHC RBC AUTO-ENTMCNC: 30 G/DL (ref 32–36)
MCV RBC AUTO: 105 FL (ref 80–99)
MONOCYTES # BLD AUTO: 0.3 X 10^3 (ref 0–1)
MONOCYTES NFR BLD AUTO: 6 % (ref 0–12)
NEUTROPHILS # BLD AUTO: 3.8 X 10^3 (ref 1.8–7.8)
NEUTROPHILS NFR BLD AUTO: 82 % (ref 42–75)
PLATELET # BLD: 113 10^3/UL (ref 130–400)
PMV BLD AUTO: 10.8 FL (ref 7.4–10.4)
POTASSIUM SERPL-SCNC: 6 MMOL/L (ref 3.6–5)
PROT SERPL-MCNC: 5.8 GM/DL (ref 6.4–8.2)
RBC # BLD AUTO: 2.87 10^6/UL (ref 4.35–5.85)
SODIUM SERPL-SCNC: 140 MMOL/L (ref 135–145)
WBC # BLD AUTO: 4.6 10^3/UL (ref 4.3–11)

## 2018-03-27 PROCEDURE — 83880 ASSAY OF NATRIURETIC PEPTIDE: CPT

## 2018-03-27 PROCEDURE — 74176 CT ABD & PELVIS W/O CONTRAST: CPT

## 2018-03-27 PROCEDURE — 83735 ASSAY OF MAGNESIUM: CPT

## 2018-03-27 PROCEDURE — 85025 COMPLETE CBC W/AUTO DIFF WBC: CPT

## 2018-03-27 PROCEDURE — 86141 C-REACTIVE PROTEIN HS: CPT

## 2018-03-27 PROCEDURE — 80053 COMPREHEN METABOLIC PANEL: CPT

## 2018-03-27 PROCEDURE — 71045 X-RAY EXAM CHEST 1 VIEW: CPT

## 2018-03-27 PROCEDURE — 36415 COLL VENOUS BLD VENIPUNCTURE: CPT

## 2018-03-27 NOTE — DIAGNOSTIC IMAGING REPORT
PROCEDURE: CT abdomen and pelvis without contrast.



TECHNIQUE: Multiple contiguous axial images were obtained through

the abdomen and pelvis without the use of intravenous contrast. 



INDICATION: Lethargy.



There is mild basilar atelectasis, bilaterally. There may be a

small amount of pleural fluid as well.



Unenhanced images of the liver reveal no focal lesion.

Gallbladder surgically absent. Spleen is prominent without

evidence of focal lesion. There is extensive atherosclerotic

calcification of aorta and visceral vessels. There is no evidence

of pancreatic or adrenal gland lesion. Approximately 3 cm cyst is

seen in the posterior left kidney. There is also an approximately

2 cm cystic structure which may be complicated along the inferior

pole of the right kidney. No free fluid is identified. Numerous

colonic diverticula are seen without evidence of inflammation.

There is no evidence of free fluid or pathologic adenopathy in

the pelvis.



IMPRESSION: Probable bilateral renal cysts which are incompletely

evaluated on the noncontrasted exam. If indicated, renal

ultrasonography may be of value.



No acute abnormality seen in the abdomen or pelvis. There is an

approximately 3.1 cm infrarenal abdominal aortic aneurysm and

moderate lumbar spondylosis.



Dictated by: 



  Dictated on workstation # [id number]

## 2018-03-27 NOTE — DIAGNOSTIC IMAGING REPORT
INDICATION: Dyspnea.



0238 hours.



Portable semiupright AP view of the chest is obtained with

comparison made study of 03/25/2018.



FINDINGS: There is mild cardiomegaly. No pneumothorax or

consolidation is identified. There is no evidence of significant

pleural fluid.



IMPRESSION: Mild cardiomegaly without evidence of acute

abnormality or adverse change.



Dictated by: 



  Dictated on workstation # [id number]

## 2018-03-27 NOTE — ED RESPIRATORY
General


Chief Complaint:  Respiratory Problems


Stated Complaint:  LETHARGIC


Source:  patient, EMS, nursing home records


Exam Limitations:  clinical condition (Alzheimer's dementia)





History of Present Illness


Date Seen by Provider:  Mar 27, 2018


Time Seen by Provider:  02:19


Initial Comments


Patient presents to the [facility] with a chief complaint per nursing home 

staff the patient was acting lethargic and had a sat in the upper 80% range. 

She was using 2 L by oxygen concentrator and has a documented oxygen 

saturations were 90% so they increased it to 4 L and it went up to 95% with 

otherwise unremarkable vital signs per nursing home notes. She has recently 

been seen in the ER for a fall related to a urinary tract infection which she 

is under treatment for ciprofloxacin. Her workup at that time was uneventful. 

She is also on azithromycin because she was found to have basilar atelectasis 

on an x-ray. She has a history of COPD. She is on steroids according to the 

[facility]. EMS reports that when they got there the patient was alert and answering 

all questions appropriately. COPD is not actually on her list of diagnoses 

however she does have albuterol ordered as well as 4 more days of prednisone 40 

mg daily. EMS reports that she sounded quite wheezy and distant breath sounds 

they gave her a DuoNeb treatment en route and that improved her breath sounds 

as well as her mentation immensely. They said on the ride over to the ER she 

was upright, alert and asking questions. EMS stated that her mouth dryness of 

initiated some fluids through her IV.





Allergies and Home Medications


Allergies


Coded Allergies:  


     No Known Drug Allergies (Unverified , 3/23/18)





Home Medications


Albuterol Sulfate 2.5 Mg/3 Ml Vial.neb, 2.5 MG IH UD


   Prescribed by: CAIN MOORE on 3/25/18 2035


Prednisone 20 Mg Tab, 20 MG PO BID


   Prescribed by: CAIN MOORE on 3/25/18 2041





Patient Home Medication List


Home Medication List Reviewed:  Yes





Constitutional:  see HPI (complete review of systems is difficult to obtain 

secondary to the patient's), No chills, No diaphoresis


EENTM:  No ear pain, No eye pain


Respiratory:  No cough, No short of breath, No wheezing


Cardiovascular:  No chest pain, No palpitations


Gastrointestinal:  No abdominal pain, No constipation, No diarrhea, No nausea


Genitourinary:  No discharge, No dysuria


Musculoskeletal:  No back pain, No joint pain





Past Medical-Social-Family Hx


Patient Social History


Alcohol Use:  Denies Use


Recreational Drug Use:  No


Smoking Status:  Never a Smoker


2nd Hand Smoke Exposure:  No


Recent Foreign Travel:  No


Contact w/Someone Who Travel:  No


Recent Hopitalizations:  No





Immunizations Up To Date


Tetanus Booster (TDap):  Unknown


PED Vaccines UTD:  Yes





Seasonal Allergies


Seasonal Allergies:  No





Surgeries


History of Surgeries:  Yes





Respiratory


History of Respiratory Disorde:  No





Cardiovascular


History of Cardiac Disorders:  Yes


Cardiac Disorders:  Hypertension





Neurological


History of Neurological Disord:  Yes


Neurological Disorders:  Dementia





Genitourinary


History of Genitourinary Disor:  Yes


Genitourinary Disorders:  UTI-Chronic





Gastrointestinal


History of Gastrointestinal Di:  Yes


Gastrointestinal Disorders:  Chronic Constipation





Musculoskeletal


History of Musculoskeletal Dis:  No





Endocrine


History of Endocrine Disorders:  Yes


Endocrine Disorders:  Hypothyroidsim





HEENT


History of HEENT Disorders:  No





Cancer


History of Cancer:  No





Psychosocial


History of Psychiatric Problem:  Yes


Behavioral Health Disorders:  Anxiety, Depression





Integumentary


History of Skin or Integumenta:  No





Family Medical History


Significant Family History:  No Pertinent Family Hx





Physical Exam


Vital Signs





Vital Signs - First Documented








 3/27/18





 02:15


 


Temp 98.9


 


Pulse 82


 


Resp 18


 


B/P (MAP) 138/57 (84)


 


Pulse Ox 93


 


O2 Delivery Nasal Cannula


 


O2 Flow Rate 2.00





Capillary Refill :


General Appearance:  WD/WN, no apparent distress


Eyes:  Bilateral Eye Normal Inspection, Bilateral Eye PERRL, Bilateral Eye EOMI


HEENT:  PERRL/EOMI, normal ENT inspection, pharynx normal (oropharynx is dry)


Neck:  non-tender, supple, normal inspection


Respiratory:  chest non-tender, no respiratory distress, no accessory muscle use

, wheezing (few bilateral), expiration


Cardiovascular:  normal peripheral pulses, regular rate, rhythm


Gastrointestinal:  normal bowel sounds, soft, no organomegaly, No distended, No 

guarding, No rebound, tenderness (mild tenderness to right flank)


Extremities:  no pedal edema, no calf tenderness, normal capillary refill


Neurologic/Psychiatric:  alert, normal mood/affect, oriented x 3


Skin:  normal color, warm/dry





Progress/Results/Core Measures


Suspected Sepsis


SIRS


Temperature: 


Pulse:  


Respiratory Rate: 


 


Laboratory Tests


3/27/18 02:20: White Blood Count 4.6


Blood Pressure  / 


Mean: 


 





Laboratory Tests


3/27/18 02:20: 


Creatinine 1.92H, Platelet Count 113L, Total Bilirubin 0.4








Results/Orders


Lab Results





Laboratory Tests








Test


  3/27/18


02:20 Range/Units


 


 


White Blood Count


  4.6 


  4.3-11.0


10^3/uL


 


Red Blood Count


  2.87 L


  4.35-5.85


10^6/uL


 


Hemoglobin 9.1 L 11.5-16.0  G/DL


 


Hematocrit 30 L 35-52  %


 


Mean Corpuscular Volume 105 H 80-99  FL


 


Mean Corpuscular Hemoglobin 32  25-34  PG


 


Mean Corpuscular Hemoglobin


Concent 30 L


  32-36  G/DL


 


 


Red Cell Distribution Width 16.2 H 10.0-14.5  %


 


Platelet Count


  113 L


  130-400


10^3/uL


 


Mean Platelet Volume 10.8 H 7.4-10.4  FL


 


Neutrophils (%) (Auto) 82 H 42-75  %


 


Lymphocytes (%) (Auto) 9 L 12-44  %


 


Monocytes (%) (Auto) 6  0-12  %


 


Eosinophils (%) (Auto) 2  0-10  %


 


Basophils (%) (Auto) 0  0-10  %


 


Neutrophils # (Auto) 3.8  1.8-7.8  X 10^3


 


Lymphocytes # (Auto) 0.4 L 1.0-4.0  X 10^3


 


Monocytes # (Auto) 0.3  0.0-1.0  X 10^3


 


Eosinophils # (Auto)


  0.1 


  0.0-0.3


10^3/uL


 


Basophils # (Auto)


  0.0 


  0.0-0.1


10^3/uL


 


Sodium Level 140  135-145  MMOL/L


 


Potassium Level 6.0 H 3.6-5.0  MMOL/L


 


Chloride Level 107    MMOL/L


 


Carbon Dioxide Level 27  21-32  MMOL/L


 


Anion Gap 6  5-14  MMOL/L


 


Blood Urea Nitrogen 46 H 7-18  MG/DL


 


Creatinine


  1.92 H


  0.60-1.30


MG/DL


 


Estimat Glomerular Filtration


Rate 25 


   


 


 


BUN/Creatinine Ratio 24   


 


Glucose Level 107 H   MG/DL


 


Calcium Level 8.4 L 8.5-10.1  MG/DL


 


Magnesium Level 1.8  1.8-2.4  MG/DL


 


Total Bilirubin 0.4  0.1-1.0  MG/DL


 


Aspartate Amino Transf


(AST/SGOT) 14 


  5-34  U/L


 


 


Alanine Aminotransferase


(ALT/SGPT) 13 


  0-55  U/L


 


 


Alkaline Phosphatase 42    U/L


 


C-Reactive Protein High


Sensitivity 7.34 H


  0.00-0.50


MG/DL


 


B-Type Natriuretic Peptide 532.5 H <100.0  PG/ML


 


Total Protein 5.8 L 6.4-8.2  GM/DL


 


Albumin 3.4  3.2-4.5  GM/DL








My Orders





Orders - GREG SCHNEIDER


Chest 1 View, Ap/Pa Only (3/27/18 02:21)


Albuterol  Pre-Mix Nebs (Rt) (Proventil (3/27/18 02:21)


BNP (3/27/18 02:21)


Cbc With Automated Diff (3/27/18 02:21)


Comprehensive Metabolic Panel (3/27/18 02:21)


Hs C Reactive Protein (3/27/18 02:21)


Magnesium (3/27/18 02:21)


Saline Lock/Iv-Start (3/27/18 02:21)


Ns Iv 500 Ml (Sodium Chloride 0.9%) (3/27/18 02:21)


Svn Sm Volume Nebulizer Rt-Rfs (3/27/18 02:21)


Ct Abdomen/Pelvis Wo (3/27/18 02:57)





Vital Signs/I&O





Vital Sign - Last 12Hours








 3/27/18





 02:15


 


Temp 98.9


 


Pulse 82


 


Resp 18


 


B/P (MAP) 138/57 (84)


 


Pulse Ox 93


 


O2 Delivery Nasal Cannula


 


O2 Flow Rate 2.00





Capillary Refill :


Progress Note #1:  


   Time:  02:35


Progress Note


Patient is on her home dose of O2 satting about 89%. Her wheezing is fairly 

mild compared to the report given from EMS prior to her DuoNeb treatment. They 

gave her another round of albuterol see if can't get it to improve some. Her 

sats done improved some she may benefit from an overnight stay some workup and 

continued steroids and azithromycin. This could also at the nursing home but it 

sounds like she wasn't getting any kind nebulized albuterol. Her altered mental 

state could come from the fact that its 2:00 in the morning versus possibly 

hypoxia if she is having some bronchoconstriction.  she has a UTI which she 

just make her that much easier to have delirium. If you're trying to avoid 

delirium we can treat her hypoxia and try and keep her in a familiar 

environment. We'll let her continue on the ciprofloxacin for her UTI and her 

primary care team can had just her antibiotics according to the culture and 

sensitivity that they obtained. The patient is not diabetic and an Accu check 

has not been obtained; however she is alert and answering questions so we'll 

just get the blood sugar off CMP.


Progress Note #2:  


   Time:  02:59


Progress Note


Patient's abrupt a milligram per deciliter of hemoglobin in the last 2 days 

after having 2 falls. She still having some right-sided abdominal and flank 

pain. Her creatinine is not in any conducive to using contrast CT abdomen 

pelvis but we'll do a noncontrast study for overt bleeds.


The BNP is mildly elevated at 500. There is no historical contacts to compare 

this to however clinically she appears to be dry has no crackles and no 

radiographic findings of pulmonary congestion suggest that she is fluid 

overloaded. While she has received some extra fluids probably threw her recent 

ER trips she still appears to be dry and has no pedal edema. This probably does 

not relate to her current mild hypoxia.


Minimal elevation of the CRP may be due to her UTI. She's had an occasional 

rare dry cough since she's been here. White blood cell count is not elevated. A 

pneumonia is unlikely. By just giving her a breathing treatment increasing her 

oxygen by nasal cannula from 2-3 L her sats as stated in the mid 90s. She got 

up on her own with standby assist from nursing staff to go to the bathroom. She 

is probably appropriate to be in a nursing home and have her oxygen saturation 

titrated as per discharge orders from her primary care provider as well as some 

extra support while she is on the antibiotics and steroids. If no pathology is 

noted on the CT of her abdomen that would correlate with her very minimal drop 

in hemoglobin then she should be good to go back to the nursing home for 

continued management outpatient by the PCP team.





Diagnostic Imaging





   Diagonstic Imaging:  Xray


   Plain Films/CT/US/NM/MRI:  chest (1v)


Comments


Chest x-ray unchanged from 2 days ago 3/25/18. No significant pleural effusions 

or infiltrates. Cardiomegaly noted.


   Reviewed:  Reviewed by Me








   Diagonstic Imaging:  CT (without contrast)


   Plain Films/CT/US/NM/MRI:  abdomen, pelvis


Comments


No acute pathology. No free air or significant fluid collection noted. No 

fracture or destructive bony lesions. Soft tissues are unremarkable. 3.1 cm 

infrarenal abdominal aortic aneurysm. No evidence of rupture.





Departure


Impression


Impression:  


 Primary Impression:  


 COPD with acute exacerbation


Disposition:  01 HOME, SELF-CARE


Condition:  Improved





Departure-Patient Inst.


Decision time for Depature:  03:34


Referrals:  


KWABENA ROWAN MD (PCP/Family)


Primary Care Physician


Patient Instructions:  Acute Bronchitis, Adult (DC)





Add. Discharge Instructions:  


Patient is already on steroids and azithromycin and has when necessary doses of 

albuterol. Would continue to use the albuterol as prescribed and follow up with 

the primary care physician as needed. For the next week please give DuoNeb 1 

unit dose twice a day on a schedule.


Follow discharge instructions to titrate oxygen sats between 89 and 96% using 

nasal cannula 0-4 L/m. 





All discharge instructions reviewed with patient and/or family. Voiced 

understanding.


Scripts


Ipratropium/Albuterol Sulfate (Iprat-Albut 0.5-3(2.5) mg/3 ml) 3 Ml Ampul.neb


3 ML IH TID for 7 Days, #21 EACH 0 Refills


   Prov: GREG SCHNEIDER         3/27/18





Copy


Copies To 1:   KWABENA ROWAN MD, TITUS J Mar 27, 2018 02:32

## 2018-03-30 ENCOUNTER — HOSPITAL ENCOUNTER (OUTPATIENT)
Dept: HOSPITAL 75 - RAD | Age: 83
End: 2018-03-30
Attending: PHYSICIAN ASSISTANT
Payer: MEDICARE

## 2018-03-30 DIAGNOSIS — I99.8: Primary | ICD-10-CM

## 2018-03-30 DIAGNOSIS — R53.1: ICD-10-CM

## 2018-03-30 DIAGNOSIS — G93.89: ICD-10-CM

## 2018-03-30 DIAGNOSIS — J32.8: ICD-10-CM

## 2018-03-30 DIAGNOSIS — R29.6: ICD-10-CM

## 2018-03-30 DIAGNOSIS — R41.82: ICD-10-CM

## 2018-03-30 PROCEDURE — 70544 MR ANGIOGRAPHY HEAD W/O DYE: CPT

## 2018-03-30 PROCEDURE — 70551 MRI BRAIN STEM W/O DYE: CPT

## 2018-03-30 PROCEDURE — 70547 MR ANGIOGRAPHY NECK W/O DYE: CPT

## 2018-03-30 NOTE — DIAGNOSTIC IMAGING REPORT
CLINICAL INDICATION: Patient complains of hearing sounds in her

head, difficulty talking on the phone due to sounds. The patient

has left leg weakness and difficulty walking with frequent falls

and difficulty sleeping at night.



EXAM: 

MRI of the brain/ IACs performed without and with 18 cc of

Gadavist IV contrast.  Sequences include sagittal T1, axial T2,

axial flair, axial T1, axial gradient echo, DWI, ADC map, axial

T2 3D fiesta, axial T1 post contrast whole brain, coronal T1

fat-sat post IV contrast whole brain, sagittal T1 post IV

contrast whole brain, axial T1 post IV contrast thin fat sat, and

coronal T1 post IV contrast thin.



COMPARISONS: Head CT without IV contrast dated 3/25/2018. 



FINDINGS:



There is no evidence of acute cerebral infarct, intracranial

hemorrhage, or gross mass effect. 



The brain parenchymal volume appears appropriate for patient's

age. There are focal and mildly confluent areas of high T2 signal

white matter changes involving both cerebral hemispheres,

periventricular regions, and nicanor, likely representing chronic

small vessel ischemic disease. There is a subtle roughly 3 mm

small area of low gradient echo signal in the mid left

periventricular region seen on series 7, image 15 which may

represent an area of remote microhemorrhage. The other sequences

show no significant abnormality in the region. Cavernous

malformation may also be considered. There is normal gray-white

matter distinction.  There is no significant midline shift or

herniation. 



The [place] vascular structures show no gross

abnormality as visualized. The pituitary gland, sella, and

suprasellar regions are unremarkable as visualized. There is no

evidence of hydrocephalus. The basal cisterns are unremarkable.

The skull, extracranial soft tissue, and orbits are unremarkable.

There is moderate to large amounts of mucosal thickening in left

maxillary sinus. There is minimal mucosal thickening involving

ethmoid sinus, right maxillary sinus, sphenoid sinus, and ethmoid

sinus.



There is motion artifact which obscures the axial 3-D fiesta

sequence. There is low-density prominence in the right cerebellar

pontine angle region which is more so than the left, but there is

motion artifact blurring the area and it cannot be accurately

defined. This area measures grossly 4 mm in width and is seen on

series 9, images 59 through 62. This is in the region of the

right seventh and eighth cranial nerves. There is also note of a

vessel seen adjacent to the region.



The left IAC and cerebellopontine angle region is unremarkable.

The remainder of the visualized cranial nerves and basal cistern

regions are unremarkable.



The inner ear fluid-filled structures are grossly unremarkable.

There is a small amount of fluid in both mastoid air cells.



IMPRESSION:



1: There is motion artifact which obscures the axial 3-D fiesta

sequence limiting evaluation of the IACs and cerebellopontine

angle. There is an area of amorphous thickening in the right

cerebellopontine angle region adjacent to or involving the right

seventh/eighth cranial nerves. This may represent artifact, but a

mass cannot be completely excluded. MRI of the IACs with and

without IV contrast is suggested for further evaluation.



2. There is no evidence of acute intracranial process.



3. Age-related brain parenchymal changes with chronic small

vessel ischemic disease.



4:  There is a 3 mm area in the mid left periventricular region,

which may represent remote microhemorrhage versus cavernous

malformation.



5: Diffuse paranasal sinus disease with the left maxillary sinus

affected the most. There is a small amount of fluid in both

mastoid air cells.



Dictated by: 



  Dictated on workstation # [id number]

## 2018-03-30 NOTE — DIAGNOSTIC IMAGING REPORT
PROCEDURE: MR angiography neck without contrast.



TECHNIQUE: Non contrast enhanced MR angiography of the neck was

performed. Source data was reformatted into rotating MIP

projections.



INDICATION: Left leg weakness and difficulty walking with

frequent falls.



COMPARISON:  No prior studies are available for comparison.



FINDINGS:  Study is significantly limited. There is a large

amount of motion on the study which does create significant

reconstruction artifact. The vertebral and carotid arteries are

patent. Assessment for possible stenosis cannot be performed on

these images due to marked artifact. No complete occlusion is

identified.



IMPRESSION: Significantly limited study due to artifact and

motion. No gross abnormality seen. If there is concern for

carotid stenosis, CT angiography or carotid Doppler can be

performed for further evaluation.



Dictated by: 



  Dictated on workstation # [id number]

## 2018-03-30 NOTE — DIAGNOSTIC IMAGING REPORT
PROCEDURE: MR angiography of the brain without the use of

contrast.



TECHNIQUE: 3D time-of-flight non contrast enhanced MR angiography

of the head was performed. A source data was reformatted into

rotating MIP projections.



INDICATION: Left leg weakness and difficulty walking with

frequent falls.



No prior MRI studies are available for comparison.



There is perfusion within the distal vertebral arteries and

basilar artery. Flow is identified in the distal internal carotid

arteries. There is flow in the anterior, middle and posterior

cerebral arteries. No intracranial stenosis is seen. No aneurysm

or vascular malformation is seen.



IMPRESSION: Unremarkable MRA of the brain.



Dictated by: 



  Dictated on workstation # [id number]

## 2018-04-25 ENCOUNTER — HOSPITAL ENCOUNTER (OUTPATIENT)
Dept: HOSPITAL 75 - CARD | Age: 83
LOS: 90 days | Discharge: HOME | End: 2018-07-24
Attending: INTERNAL MEDICINE
Payer: COMMERCIAL

## 2018-04-25 DIAGNOSIS — R06.02: ICD-10-CM

## 2018-04-25 DIAGNOSIS — I12.9: Primary | ICD-10-CM

## 2018-04-25 DIAGNOSIS — J44.9: ICD-10-CM

## 2018-04-25 DIAGNOSIS — E78.5: ICD-10-CM

## 2018-04-25 DIAGNOSIS — N18.9: ICD-10-CM

## 2018-04-25 PROCEDURE — 93225 XTRNL ECG REC<48 HRS REC: CPT

## 2018-04-25 PROCEDURE — 93226 XTRNL ECG REC<48 HR SCAN A/R: CPT

## 2018-05-04 ENCOUNTER — HOSPITAL ENCOUNTER (OUTPATIENT)
Dept: HOSPITAL 75 - CARD | Age: 83
End: 2018-05-04
Attending: INTERNAL MEDICINE
Payer: MEDICARE

## 2018-05-04 DIAGNOSIS — N18.9: ICD-10-CM

## 2018-05-04 DIAGNOSIS — I34.0: ICD-10-CM

## 2018-05-04 DIAGNOSIS — J44.9: Primary | ICD-10-CM

## 2018-05-04 DIAGNOSIS — R06.02: ICD-10-CM

## 2018-05-04 DIAGNOSIS — E78.5: ICD-10-CM

## 2018-05-04 DIAGNOSIS — I12.9: ICD-10-CM

## 2018-05-04 PROCEDURE — 93306 TTE W/DOPPLER COMPLETE: CPT

## 2023-04-03 NOTE — DIAGNOSTIC IMAGING REPORT
PROCEDURE: CT head and CT cervical spine without contrast.



TECHNIQUE: Multiple contiguous axial images were obtained through

the brain and cervical spine without the use of intravenous

contrast. Sagittal and coronal reformations through the cervical

spine were then performed.



INDICATION: Fall. Patient noncompliant.



COMPARISON: None.



FINDINGS:



HEAD CT: No acute intracranial hemorrhage, mass effect or edema

is seen. Gray-white junction is preserved. Ventricles appear

normal. There are moderate changes of probable chronic

microvascular ischemic disease in the white matter. There is some

chronic appearing mucosal thickening in left maxillary sinus.



CERVICAL SPINE CT: Evaluation is limited by motion artifact.

There is some straightening of the normal cervical lordosis which

is nonspecific but may be muscular positional. There is also mild

anterior subluxation of C4 and C5 and C5 on C6 which is likely

degenerative. No acute fracture or traumatic malalignment is

suspected. Vertebral body heights are maintained. There is disc

space narrowing and spurring at multiple levels, particularly at

C6/C7. There is facet arthropathy throughout cervical spine with

multiple levels of foraminal narrowing.



IMPRESSION:

1. No evidence of an acute intracranial abnormality.

2. No evidence of acute cervical spine abnormality. Evaluation is

slightly limited due to motion artifact. There are diffuse

degenerative changes as described.



Agree with [facility] interpretation.



 



Dictated by: 



  Dictated on workstation # [id number] Consent 3/Introductory Paragraph: I gave the patient a chance to ask questions they had about the procedure.  Following this I explained the Mohs procedure and consent was obtained. The risks, benefits and alternatives to therapy were discussed in detail. Specifically, the risks of infection, scarring, bleeding, prolonged wound healing, incomplete removal, allergy to anesthesia, nerve injury and recurrence were addressed. Prior to the procedure, the treatment site was clearly identified and confirmed by the patient. All components of Universal Protocol/PAUSE Rule completed.